# Patient Record
Sex: FEMALE | Race: WHITE | NOT HISPANIC OR LATINO | Employment: OTHER | URBAN - METROPOLITAN AREA
[De-identification: names, ages, dates, MRNs, and addresses within clinical notes are randomized per-mention and may not be internally consistent; named-entity substitution may affect disease eponyms.]

---

## 2017-01-24 ENCOUNTER — TRANSCRIBE ORDERS (OUTPATIENT)
Dept: ADMINISTRATIVE | Facility: HOSPITAL | Age: 57
End: 2017-01-24

## 2017-01-24 DIAGNOSIS — R10.9 ABDOMINAL PAIN, UNSPECIFIED SITE: Primary | ICD-10-CM

## 2017-02-12 ENCOUNTER — GENERIC CONVERSION - ENCOUNTER (OUTPATIENT)
Dept: OTHER | Facility: OTHER | Age: 57
End: 2017-02-12

## 2017-03-01 ENCOUNTER — ALLSCRIPTS OFFICE VISIT (OUTPATIENT)
Dept: OTHER | Facility: OTHER | Age: 57
End: 2017-03-01

## 2018-01-13 VITALS
RESPIRATION RATE: 16 BRPM | HEART RATE: 80 BPM | SYSTOLIC BLOOD PRESSURE: 112 MMHG | HEIGHT: 64 IN | DIASTOLIC BLOOD PRESSURE: 78 MMHG | BODY MASS INDEX: 25.95 KG/M2 | WEIGHT: 152 LBS | OXYGEN SATURATION: 97 % | TEMPERATURE: 97.7 F

## 2018-01-15 NOTE — RESULT NOTES
Message   Please inform the patient that her recent laboratory studies are normal including her blood count and kidney function  Please make sure that she has completed the CAT scan which was ordered  Please have her call with any questions or concerns or change in symptoms  Verified Results  (1) BASIC METABOLIC PROFILE [id number] 01:18PM Diego Lopez     Test Name Result Flag Reference   Glucose, Serum 99 mg/dL  65-99   BUN 14 mg/dL  6-24   Creatinine, Serum 0 68 mg/dL  0 57-1 00   eGFR If NonAfricn Am 97 mL/min/1 73  >59   eGFR If Africn Am 112 mL/min/1 73  >59   BUN/Creatinine Ratio 21  9-23   Sodium, Serum 141 mmol/L  134-144   Potassium, Serum 4 1 mmol/L  3 5-5 2   Chloride, Serum 101 mmol/L     Carbon Dioxide, Total 24 mmol/L  18-29   Calcium, Serum 9 4 mg/dL  8 7-10 2     (1) CBC/ PLT (NO DIFF) [id number] 01:18PM Dani Meredith     Test Name Result Flag Reference   WBC 6 3 x10E3/uL  3 4-10 8   RBC 4 85 x10E6/uL  3 77-5 28   Hemoglobin 14 3 g/dL  11 1-15 9   Hematocrit 41 6 %  34 0-46  6   MCV 86 fL  79-97   MCH 29 5 pg  26 6-33 0   MCHC 34 4 g/dL  31 5-35 7   RDW 13 9 %  12 3-15 4   Platelets 567 E51E4/Latvian  150-379

## 2019-09-24 ENCOUNTER — HOSPITAL ENCOUNTER (OUTPATIENT)
Facility: HOSPITAL | Age: 59
Setting detail: OBSERVATION
Discharge: HOME | End: 2019-09-26
Attending: EMERGENCY MEDICINE | Admitting: STUDENT IN AN ORGANIZED HEALTH CARE EDUCATION/TRAINING PROGRAM
Payer: COMMERCIAL

## 2019-09-24 ENCOUNTER — APPOINTMENT (EMERGENCY)
Dept: RADIOLOGY | Facility: HOSPITAL | Age: 59
End: 2019-09-24
Payer: COMMERCIAL

## 2019-09-24 DIAGNOSIS — K57.92 DIVERTICULITIS: Primary | ICD-10-CM

## 2019-09-24 DIAGNOSIS — R82.71 BACTERIURIA: ICD-10-CM

## 2019-09-24 LAB
ALBUMIN SERPL BCP-MCNC: 3.9 G/DL (ref 3.5–5)
ALP SERPL-CCNC: 92 U/L (ref 46–116)
ALT SERPL W P-5'-P-CCNC: 27 U/L (ref 12–78)
ANION GAP SERPL CALCULATED.3IONS-SCNC: 9 MMOL/L (ref 4–13)
APTT PPP: 28 SECONDS (ref 23–37)
AST SERPL W P-5'-P-CCNC: 14 U/L (ref 5–45)
BACTERIA UR QL AUTO: ABNORMAL /HPF
BASOPHILS # BLD AUTO: 0.02 THOUSANDS/ΜL (ref 0–0.1)
BASOPHILS NFR BLD AUTO: 0 % (ref 0–1)
BILIRUB SERPL-MCNC: 0.9 MG/DL (ref 0.2–1)
BILIRUB UR QL STRIP: NEGATIVE
BUN SERPL-MCNC: 9 MG/DL (ref 5–25)
CALCIUM SERPL-MCNC: 8.6 MG/DL (ref 8.3–10.1)
CHLORIDE SERPL-SCNC: 102 MMOL/L (ref 100–108)
CLARITY UR: ABNORMAL
CO2 SERPL-SCNC: 28 MMOL/L (ref 21–32)
COLOR UR: ABNORMAL
CREAT SERPL-MCNC: 0.74 MG/DL (ref 0.6–1.3)
EOSINOPHIL # BLD AUTO: 0.09 THOUSAND/ΜL (ref 0–0.61)
EOSINOPHIL NFR BLD AUTO: 1 % (ref 0–6)
ERYTHROCYTE [DISTWIDTH] IN BLOOD BY AUTOMATED COUNT: 12.7 % (ref 11.6–15.1)
GFR SERPL CREATININE-BSD FRML MDRD: 88 ML/MIN/1.73SQ M
GLUCOSE SERPL-MCNC: 111 MG/DL (ref 65–140)
GLUCOSE UR STRIP-MCNC: NEGATIVE MG/DL
HCT VFR BLD AUTO: 40.2 % (ref 34.8–46.1)
HGB BLD-MCNC: 13 G/DL (ref 11.5–15.4)
HGB UR QL STRIP.AUTO: ABNORMAL
IMM GRANULOCYTES # BLD AUTO: 0.03 THOUSAND/UL (ref 0–0.2)
IMM GRANULOCYTES NFR BLD AUTO: 0 % (ref 0–2)
INR PPP: 0.92 (ref 0.91–1.09)
KETONES UR STRIP-MCNC: NEGATIVE MG/DL
LACTATE SERPL-SCNC: 0.6 MMOL/L (ref 0.5–2)
LEUKOCYTE ESTERASE UR QL STRIP: ABNORMAL
LYMPHOCYTES # BLD AUTO: 1.86 THOUSANDS/ΜL (ref 0.6–4.47)
LYMPHOCYTES NFR BLD AUTO: 21 % (ref 14–44)
MCH RBC QN AUTO: 29.1 PG (ref 26.8–34.3)
MCHC RBC AUTO-ENTMCNC: 32.3 G/DL (ref 31.4–37.4)
MCV RBC AUTO: 90 FL (ref 82–98)
MONOCYTES # BLD AUTO: 0.89 THOUSAND/ΜL (ref 0.17–1.22)
MONOCYTES NFR BLD AUTO: 10 % (ref 4–12)
NEUTROPHILS # BLD AUTO: 6 THOUSANDS/ΜL (ref 1.85–7.62)
NEUTS SEG NFR BLD AUTO: 68 % (ref 43–75)
NITRITE UR QL STRIP: NEGATIVE
NON-SQ EPI CELLS URNS QL MICRO: ABNORMAL /HPF
NRBC BLD AUTO-RTO: 0 /100 WBCS
OTHER STN SPEC: ABNORMAL
PH UR STRIP.AUTO: 7 [PH]
PLATELET # BLD AUTO: 153 THOUSANDS/UL (ref 149–390)
PMV BLD AUTO: 12.7 FL (ref 8.9–12.7)
POTASSIUM SERPL-SCNC: 3.6 MMOL/L (ref 3.5–5.3)
PROT SERPL-MCNC: 7.1 G/DL (ref 6.4–8.2)
PROT UR STRIP-MCNC: NEGATIVE MG/DL
PROTHROMBIN TIME: 10 SECONDS (ref 9.8–12)
RBC # BLD AUTO: 4.47 MILLION/UL (ref 3.81–5.12)
RBC #/AREA URNS AUTO: ABNORMAL /HPF
SODIUM SERPL-SCNC: 139 MMOL/L (ref 136–145)
SP GR UR STRIP.AUTO: 1.01 (ref 1–1.03)
UROBILINOGEN UR QL STRIP.AUTO: 0.2 E.U./DL
WBC # BLD AUTO: 8.89 THOUSAND/UL (ref 4.31–10.16)
WBC #/AREA URNS AUTO: ABNORMAL /HPF
WBC CLUMPS # UR AUTO: PRESENT /UL

## 2019-09-24 PROCEDURE — 99285 EMERGENCY DEPT VISIT HI MDM: CPT

## 2019-09-24 PROCEDURE — 74176 CT ABD & PELVIS W/O CONTRAST: CPT

## 2019-09-24 PROCEDURE — 36415 COLL VENOUS BLD VENIPUNCTURE: CPT | Performed by: EMERGENCY MEDICINE

## 2019-09-24 PROCEDURE — 85730 THROMBOPLASTIN TIME PARTIAL: CPT | Performed by: EMERGENCY MEDICINE

## 2019-09-24 PROCEDURE — 85025 COMPLETE CBC W/AUTO DIFF WBC: CPT | Performed by: EMERGENCY MEDICINE

## 2019-09-24 PROCEDURE — 87040 BLOOD CULTURE FOR BACTERIA: CPT | Performed by: EMERGENCY MEDICINE

## 2019-09-24 PROCEDURE — 83605 ASSAY OF LACTIC ACID: CPT | Performed by: EMERGENCY MEDICINE

## 2019-09-24 PROCEDURE — 81001 URINALYSIS AUTO W/SCOPE: CPT | Performed by: EMERGENCY MEDICINE

## 2019-09-24 PROCEDURE — 80053 COMPREHEN METABOLIC PANEL: CPT | Performed by: EMERGENCY MEDICINE

## 2019-09-24 PROCEDURE — 85610 PROTHROMBIN TIME: CPT | Performed by: EMERGENCY MEDICINE

## 2019-09-24 PROCEDURE — 96365 THER/PROPH/DIAG IV INF INIT: CPT

## 2019-09-24 RX ORDER — LEVOFLOXACIN 5 MG/ML
750 INJECTION, SOLUTION INTRAVENOUS ONCE
Status: COMPLETED | OUTPATIENT
Start: 2019-09-24 | End: 2019-09-25

## 2019-09-24 RX ADMIN — LEVOFLOXACIN 750 MG: 5 INJECTION, SOLUTION INTRAVENOUS at 23:33

## 2019-09-24 NOTE — LETTER
[address] [facility] [address]  Dept: [phone]    September 26, 2019     Patient: Clemente Knight   YOB: 1960   Date of Visit: 9/24/2019       To Whom it May Concern:    Clemente Knight is under my professional care  She was seen in the hospital from 9/24/2019   to 09/26/19  She may return to work on 9/30/19    If you have any questions or concerns, please don't hesitate to call           Sincerely,          Mamta Hirsch, DO

## 2019-09-25 PROBLEM — K57.92 ACUTE DIVERTICULITIS: Status: ACTIVE | Noted: 2019-09-25

## 2019-09-25 PROBLEM — J45.909 ASTHMA: Status: ACTIVE | Noted: 2019-09-25

## 2019-09-25 PROBLEM — G47.33 OSA (OBSTRUCTIVE SLEEP APNEA): Status: ACTIVE | Noted: 2019-09-25

## 2019-09-25 LAB
ANION GAP SERPL CALCULATED.3IONS-SCNC: 9 MMOL/L (ref 4–13)
BASOPHILS # BLD AUTO: 0.02 THOUSANDS/ΜL (ref 0–0.1)
BASOPHILS NFR BLD AUTO: 0 % (ref 0–1)
BUN SERPL-MCNC: 8 MG/DL (ref 5–25)
CALCIUM SERPL-MCNC: 8.3 MG/DL (ref 8.3–10.1)
CHLORIDE SERPL-SCNC: 105 MMOL/L (ref 100–108)
CO2 SERPL-SCNC: 26 MMOL/L (ref 21–32)
CREAT SERPL-MCNC: 0.7 MG/DL (ref 0.6–1.3)
EOSINOPHIL # BLD AUTO: 0.07 THOUSAND/ΜL (ref 0–0.61)
EOSINOPHIL NFR BLD AUTO: 1 % (ref 0–6)
ERYTHROCYTE [DISTWIDTH] IN BLOOD BY AUTOMATED COUNT: 12.6 % (ref 11.6–15.1)
GFR SERPL CREATININE-BSD FRML MDRD: 94 ML/MIN/1.73SQ M
GLUCOSE P FAST SERPL-MCNC: 108 MG/DL (ref 65–99)
GLUCOSE SERPL-MCNC: 108 MG/DL (ref 65–140)
HCT VFR BLD AUTO: 36.8 % (ref 34.8–46.1)
HGB BLD-MCNC: 11.9 G/DL (ref 11.5–15.4)
IMM GRANULOCYTES # BLD AUTO: 0.03 THOUSAND/UL (ref 0–0.2)
IMM GRANULOCYTES NFR BLD AUTO: 0 % (ref 0–2)
LYMPHOCYTES # BLD AUTO: 2.02 THOUSANDS/ΜL (ref 0.6–4.47)
LYMPHOCYTES NFR BLD AUTO: 24 % (ref 14–44)
MCH RBC QN AUTO: 29.4 PG (ref 26.8–34.3)
MCHC RBC AUTO-ENTMCNC: 32.3 G/DL (ref 31.4–37.4)
MCV RBC AUTO: 91 FL (ref 82–98)
MONOCYTES # BLD AUTO: 0.8 THOUSAND/ΜL (ref 0.17–1.22)
MONOCYTES NFR BLD AUTO: 10 % (ref 4–12)
NEUTROPHILS # BLD AUTO: 5.35 THOUSANDS/ΜL (ref 1.85–7.62)
NEUTS SEG NFR BLD AUTO: 65 % (ref 43–75)
NRBC BLD AUTO-RTO: 0 /100 WBCS
PLATELET # BLD AUTO: 142 THOUSANDS/UL (ref 149–390)
PMV BLD AUTO: 12.3 FL (ref 8.9–12.7)
POTASSIUM SERPL-SCNC: 3.5 MMOL/L (ref 3.5–5.3)
RBC # BLD AUTO: 4.05 MILLION/UL (ref 3.81–5.12)
SODIUM SERPL-SCNC: 140 MMOL/L (ref 136–145)
WBC # BLD AUTO: 8.29 THOUSAND/UL (ref 4.31–10.16)

## 2019-09-25 PROCEDURE — 94660 CPAP INITIATION&MGMT: CPT

## 2019-09-25 PROCEDURE — 99219 PR INITIAL OBSERVATION CARE/DAY 50 MINUTES: CPT | Performed by: STUDENT IN AN ORGANIZED HEALTH CARE EDUCATION/TRAINING PROGRAM

## 2019-09-25 PROCEDURE — 87081 CULTURE SCREEN ONLY: CPT | Performed by: STUDENT IN AN ORGANIZED HEALTH CARE EDUCATION/TRAINING PROGRAM

## 2019-09-25 PROCEDURE — 85025 COMPLETE CBC W/AUTO DIFF WBC: CPT | Performed by: STUDENT IN AN ORGANIZED HEALTH CARE EDUCATION/TRAINING PROGRAM

## 2019-09-25 PROCEDURE — 99214 OFFICE O/P EST MOD 30 MIN: CPT | Performed by: INTERNAL MEDICINE

## 2019-09-25 PROCEDURE — 94760 N-INVAS EAR/PLS OXIMETRY 1: CPT

## 2019-09-25 PROCEDURE — 80048 BASIC METABOLIC PNL TOTAL CA: CPT | Performed by: STUDENT IN AN ORGANIZED HEALTH CARE EDUCATION/TRAINING PROGRAM

## 2019-09-25 PROCEDURE — 93005 ELECTROCARDIOGRAM TRACING: CPT

## 2019-09-25 RX ORDER — SODIUM CHLORIDE AND POTASSIUM CHLORIDE .9; .15 G/100ML; G/100ML
75 SOLUTION INTRAVENOUS CONTINUOUS
Status: DISCONTINUED | OUTPATIENT
Start: 2019-09-25 | End: 2019-09-26 | Stop reason: HOSPADM

## 2019-09-25 RX ORDER — ALBUTEROL SULFATE 2.5 MG/3ML
2.5 SOLUTION RESPIRATORY (INHALATION) EVERY 6 HOURS PRN
Status: DISCONTINUED | OUTPATIENT
Start: 2019-09-25 | End: 2019-09-26 | Stop reason: HOSPADM

## 2019-09-25 RX ORDER — LEVOFLOXACIN 5 MG/ML
750 INJECTION, SOLUTION INTRAVENOUS EVERY 24 HOURS
Status: DISCONTINUED | OUTPATIENT
Start: 2019-09-25 | End: 2019-09-25

## 2019-09-25 RX ORDER — LEVOTHYROXINE SODIUM 112 UG/1
112 TABLET ORAL
Status: DISCONTINUED | OUTPATIENT
Start: 2019-09-25 | End: 2019-09-26 | Stop reason: HOSPADM

## 2019-09-25 RX ORDER — CEFTRIAXONE 1 G/50ML
1000 INJECTION, SOLUTION INTRAVENOUS EVERY 24 HOURS
Status: DISCONTINUED | OUTPATIENT
Start: 2019-09-25 | End: 2019-09-26 | Stop reason: HOSPADM

## 2019-09-25 RX ORDER — ONDANSETRON 2 MG/ML
4 INJECTION INTRAMUSCULAR; INTRAVENOUS ONCE
Status: COMPLETED | OUTPATIENT
Start: 2019-09-25 | End: 2019-09-25

## 2019-09-25 RX ORDER — ACETAMINOPHEN 325 MG/1
650 TABLET ORAL EVERY 6 HOURS PRN
Status: DISCONTINUED | OUTPATIENT
Start: 2019-09-25 | End: 2019-09-26 | Stop reason: HOSPADM

## 2019-09-25 RX ORDER — ONDANSETRON 2 MG/ML
INJECTION INTRAMUSCULAR; INTRAVENOUS
Status: COMPLETED
Start: 2019-09-25 | End: 2019-09-25

## 2019-09-25 RX ORDER — SODIUM CHLORIDE 9 MG/ML
75 INJECTION, SOLUTION INTRAVENOUS CONTINUOUS
Status: DISCONTINUED | OUTPATIENT
Start: 2019-09-25 | End: 2019-09-25

## 2019-09-25 RX ORDER — ONDANSETRON 2 MG/ML
4 INJECTION INTRAMUSCULAR; INTRAVENOUS EVERY 6 HOURS PRN
Status: DISCONTINUED | OUTPATIENT
Start: 2019-09-25 | End: 2019-09-26 | Stop reason: HOSPADM

## 2019-09-25 RX ORDER — FLUTICASONE PROPIONATE 50 MCG
1 SPRAY, SUSPENSION (ML) NASAL DAILY
Status: DISCONTINUED | OUTPATIENT
Start: 2019-09-25 | End: 2019-09-26 | Stop reason: HOSPADM

## 2019-09-25 RX ORDER — LEVOTHYROXINE SODIUM 112 UG/1
125 TABLET ORAL DAILY
COMMUNITY
End: 2019-09-26 | Stop reason: HOSPADM

## 2019-09-25 RX ORDER — SODIUM CHLORIDE 9 MG/ML
50 INJECTION, SOLUTION INTRAVENOUS CONTINUOUS
Status: DISCONTINUED | OUTPATIENT
Start: 2019-09-25 | End: 2019-09-25

## 2019-09-25 RX ADMIN — METRONIDAZOLE 500 MG: 500 INJECTION, SOLUTION INTRAVENOUS at 17:36

## 2019-09-25 RX ADMIN — ONDANSETRON 4 MG: 2 INJECTION INTRAMUSCULAR; INTRAVENOUS at 01:49

## 2019-09-25 RX ADMIN — SODIUM CHLORIDE AND POTASSIUM CHLORIDE 75 ML/HR: .9; .15 SOLUTION INTRAVENOUS at 14:00

## 2019-09-25 RX ADMIN — METRONIDAZOLE 500 MG: 500 INJECTION, SOLUTION INTRAVENOUS at 01:43

## 2019-09-25 RX ADMIN — SODIUM CHLORIDE 50 ML/HR: 0.9 INJECTION, SOLUTION INTRAVENOUS at 02:55

## 2019-09-25 RX ADMIN — ENOXAPARIN SODIUM 40 MG: 40 INJECTION SUBCUTANEOUS at 09:49

## 2019-09-25 RX ADMIN — METRONIDAZOLE 500 MG: 500 INJECTION, SOLUTION INTRAVENOUS at 09:50

## 2019-09-25 RX ADMIN — LEVOTHYROXINE SODIUM 112 MCG: 112 TABLET ORAL at 05:24

## 2019-09-25 RX ADMIN — CEFTRIAXONE 1000 MG: 1 INJECTION, SOLUTION INTRAVENOUS at 23:29

## 2019-09-25 RX ADMIN — FLUTICASONE PROPIONATE 1 SPRAY: 50 SPRAY, METERED NASAL at 09:54

## 2019-09-25 NOTE — CONSULTS
Consultation - [address] [facility]  Alina Abreu 62 y o  female MRN: [id number]  Unit/Bed#: [address] [facility] Encounter: [id number]        Inpatient consult to gastroenterology  Consult performed by: Francisco Pineda PA-C  Consult ordered by: Bernice Malik MD        Reason for Consult / Principal Problem: Acute diverticulitis    HPI: Alina Abreu is a 62y o  year old female with past history of diverticulitis for which she was treated in 2016 who presented to the emergency room yesterday with complaint of lower abdominal pain which had been going on for approximately 2 weeks  She said it started out dull and intermittent, she later started with subjective fevers and chills, and said that the night before she came to the emergency room the pain had become much more intense and localized to left lower quadrant  She denies any blood in her stools or melena, or any recent diarrhea  CT scan of the abdomen and pelvis in the emergency room showed findings consistent with sigmoid diverticulitis, no discrete abscess was seen  White blood cell count appears normal with no bandemia, electrolytes and liver function tests appear within normal limits  She did appear febrile on presentation with temperature of 101 6° and heart rate of 100  She has been placed on clear liquid diet and started on IV Levaquin and Flagyl  At this time, the patient tells me she is feeling better than she was yesterday  Patient tells me she had 2 episodes of diverticulitis in the past, once in 2016 and once about a year before that  She follows with GI Dr Javon Rm in (patient thinks) [place], she says she gets colonoscopies every 2 years because both her sister and her mother passed away from colon cancer  Patient says that her last colonoscopy was done within the last year, she says this exam was normal but she has had polyps on previous exams    Patient denies any recent disturbances to her bowel habits, any blood or mucus in the stools, any issues with diarrhea or constipation  She traveled to [place] in [place], she denies any unusual food intake, she denies any recent sick contacts or recent use of antibiotics  She thinks her current pain is similar to that which she experienced with previous episodes of diverticulitis  REVIEW OF SYSTEMS:    CONSTITUTIONAL: Denies any fever, chills, or rigors  Good appetite, and no recent weight loss  HEENT: No earache or tinnitus  Denies hearing loss or visual disturbances  CARDIOVASCULAR: No chest pain or palpitations  RESPIRATORY: Denies any cough, hemoptysis, shortness of breath or dyspnea on exertion  GASTROINTESTINAL: As noted in the History of Present Illness  GENITOURINARY: No problems with urination  Denies any hematuria or dysuria  NEUROLOGIC: No dizziness or vertigo, denies headaches  MUSCULOSKELETAL: Denies any muscle or joint pain  SKIN: Denies skin rashes or itching  ENDOCRINE: Denies excessive thirst  Denies intolerance to heat or cold  PSYCHOSOCIAL: Denies depression or anxiety  Denies any recent memory loss  Historical Information   Past Medical History:   Diagnosis Date    Asthma     Disease of thyroid gland     Diverticulitis     Sleep apnea      Past Surgical History:   Procedure Laterality Date     SECTION      x 3    CHOLECYSTECTOMY      KNEE SURGERY Left      Social History   Social History     Substance and Sexual Activity   Alcohol Use Yes    Alcohol/week: 2 0 standard drinks    Types: 2 Cans of beer per week    Frequency: Monthly or less    Drinks per session: 1 or 2    Binge frequency: Never     Social History     Substance and Sexual Activity   Drug Use Never     Social History     Tobacco Use   Smoking Status Former Smoker    Types: Cigarettes    Last attempt to quit:     Years since quittin 7   Smokeless Tobacco Never Used     History reviewed  No pertinent family history      Meds/Allergies Medications Prior to Admission   Medication    fluticasone-salmeterol (ADVAIR DISKUS, WIXELA INHUB) 250-50 mcg/dose inhaler    levothyroxine 112 mcg tablet    other medication, see sig,    albuterol (2 5 mg/3 mL) 0 083 % nebulizer solution    fluticasone (FLONASE) 50 mcg/act nasal spray    levothyroxine 125 mcg tablet     Current Facility-Administered Medications   Medication Dose Route Frequency    acetaminophen (TYLENOL) tablet 650 mg  650 mg Oral Q6H PRN    albuterol inhalation solution 2 5 mg  2 5 mg Nebulization Q6H PRN    enoxaparin (LOVENOX) subcutaneous injection 40 mg  40 mg Subcutaneous Daily    fluticasone (FLONASE) 50 mcg/act nasal spray 1 spray  1 spray Nasal Daily    levofloxacin (LEVAQUIN) IVPB (premix) 750 mg  750 mg Intravenous Q24H    levothyroxine tablet 112 mcg  112 mcg Oral Early Morning    metroNIDAZOLE (FLAGYL) IVPB (premix) 500 mg  500 mg Intravenous Q8H    morphine injection 2 mg  2 mg Intravenous Q4H PRN    ondansetron (ZOFRAN) injection 4 mg  4 mg Intravenous Q6H PRN    sodium chloride 0 9 % infusion  50 mL/hr Intravenous Continuous       Allergies   Allergen Reactions    Shellfish-Derived Products      IV contrast dye           Objective     Blood pressure 113/60, pulse 80, temperature 100 4 °F (38 °C), temperature source Tympanic, resp  rate 20, height 5' 1" (1 549 m), weight 79 2 kg (174 lb 9 7 oz), SpO2 96 %, not currently breastfeeding        Intake/Output Summary (Last 24 hours) at 9/25/2019 0841  Last data filed at 9/25/2019 6597  Gross per 24 hour   Intake 999 ml   Output 1550 ml   Net -551 ml         PHYSICAL EXAM     General Appearance:   Alert, cooperative, no distress, appears stated age    HEENT:   Normocephalic, atraumatic, anicteric      Neck:  Supple, symmetrical, trachea midline, no adenopathy;    thyroid: no enlargement/tenderness/nodules; no carotid  bruit or JVD    Lungs:   Clear to auscultation bilaterally; no rales, rhonchi or wheezing; respirations unlabored    Heart[de-identified]   S1 and S2 normal; regular rate and rhythm; no murmur, rub, or gallop     Abdomen:   Soft, mild left lower quadrant tenderness with no guarding, non-distended; normal bowel sounds; no masses, no organomegaly    Genitalia:   Deferred    Rectal:   Deferred    Extremities:  No cyanosis, clubbing or edema    Pulses:  2+ and symmetric all extremities    Skin:  Skin color, texture, turgor normal, no rashes or lesions    Lymph nodes:  No palpable cervical, axillary or inguinal lymphadenopathy        Lab Results:   Admission on 09/24/2019   Component Date Value    PTT 09/24/2019 28     Protime 09/24/2019 10 0     INR 09/24/2019 0 92     WBC 09/24/2019 8 89     RBC 09/24/2019 4 47     Hemoglobin 09/24/2019 13 0     Hematocrit 09/24/2019 40 2     MCV 09/24/2019 90     MCH 09/24/2019 29 1     MCHC 09/24/2019 32 3     RDW 09/24/2019 12 7     MPV 09/24/2019 12 7     Platelets 14/63/9872 153     nRBC 09/24/2019 0     Neutrophils Relative 09/24/2019 68     Immat GRANS % 09/24/2019 0     Lymphocytes Relative 09/24/2019 21     Monocytes Relative 09/24/2019 10     Eosinophils Relative 09/24/2019 1     Basophils Relative 09/24/2019 0     Neutrophils Absolute 09/24/2019 6 00     Immature Grans Absolute 09/24/2019 0 03     Lymphocytes Absolute 09/24/2019 1 86     Monocytes Absolute 09/24/2019 0 89     Eosinophils Absolute 09/24/2019 0 09     Basophils Absolute 09/24/2019 0 02     Sodium 09/24/2019 139     Potassium 09/24/2019 3 6     Chloride 09/24/2019 102     CO2 09/24/2019 28     ANION GAP 09/24/2019 9     BUN 09/24/2019 9     Creatinine 09/24/2019 0 74     Glucose 09/24/2019 111     Calcium 09/24/2019 8 6     AST 09/24/2019 14     ALT 09/24/2019 27     Alkaline Phosphatase 09/24/2019 92     Total Protein 09/24/2019 7 1     Albumin 09/24/2019 3 9     Total Bilirubin 09/24/2019 0 90     eGFR 09/24/2019 88     LACTIC ACID 09/24/2019 0 6     Color, UA 09/24/2019 Light Yellow     Clarity, UA 09/24/2019 Slightly Cloudy     Specific Gravity, UA 09/24/2019 1 010     pH, UA 09/24/2019 7 0     Leukocytes, UA 09/24/2019 Small*    Nitrite, UA 09/24/2019 Negative     Protein, UA 09/24/2019 Negative     Glucose, UA 09/24/2019 Negative     Ketones, UA 09/24/2019 Negative     Urobilinogen, UA 09/24/2019 0 2     Bilirubin, UA 09/24/2019 Negative     Blood, UA 09/24/2019 Trace-lysed*    RBC, UA 09/24/2019 2-4*    WBC, UA 09/24/2019 4-10*    Epithelial Cells 09/24/2019 Moderate*    Bacteria, UA 09/24/2019 Occasional     WBC Clumps 09/24/2019 present     OTHER OBSERVATIONS 09/24/2019 Renal Epithelial Cells Present     Sodium 09/25/2019 140     Potassium 09/25/2019 3 5     Chloride 09/25/2019 105     CO2 09/25/2019 26     ANION GAP 09/25/2019 9     BUN 09/25/2019 8     Creatinine 09/25/2019 0 70     Glucose 09/25/2019 108     Glucose, Fasting 09/25/2019 108*    Calcium 09/25/2019 8 3     eGFR 09/25/2019 94     WBC 09/25/2019 8 29     RBC 09/25/2019 4 05     Hemoglobin 09/25/2019 11 9     Hematocrit 09/25/2019 36 8     MCV 09/25/2019 91     MCH 09/25/2019 29 4     MCHC 09/25/2019 32 3     RDW 09/25/2019 12 6     MPV 09/25/2019 12 3     Platelets 43/93/6146 142*    nRBC 09/25/2019 0     Neutrophils Relative 09/25/2019 65     Immat GRANS % 09/25/2019 0     Lymphocytes Relative 09/25/2019 24     Monocytes Relative 09/25/2019 10     Eosinophils Relative 09/25/2019 1     Basophils Relative 09/25/2019 0     Neutrophils Absolute 09/25/2019 5 35     Immature Grans Absolute 09/25/2019 0 03     Lymphocytes Absolute 09/25/2019 2 02     Monocytes Absolute 09/25/2019 0 80     Eosinophils Absolute 09/25/2019 0 07     Basophils Absolute 09/25/2019 0 02      Results for Yanelis Stafford (MRN [id number]) as of 9/25/2019 08:42   Ref   Range 2/1/2017 13:18 9/24/2019 22:03 9/24/2019 22:03 9/24/2019 22:14 9/25/2019 05:17   Sodium Latest Ref Range: 136 - 145 mmol/L 141 139   140   Potassium Latest Ref Range: 3 5 - 5 3 mmol/L 4 1 3 6   3 5   Chloride Latest Ref Range: 100 - 108 mmol/L 101 102   105   CO2 Latest Ref Range: 21 - 32 mmol/L 24 28   26   Anion Gap Latest Ref Range: 4 - 13 mmol/L  9   9   BUN Latest Ref Range: 5 - 25 mg/dL 14 9   8   Creatinine Latest Ref Range: 0 60 - 1 30 mg/dL 0 68 0 74   0 70   Glucose, Random Latest Ref Range: 65 - 140 mg/dL 99 111   108   GLUCOSE FASTING Latest Ref Range: 65 - 99 mg/dL     108 (H)   Calcium Latest Ref Range: 8 3 - 10 1 mg/dL 9 4 8 6   8 3   AST Latest Ref Range: 5 - 45 U/L  14      ALT Latest Ref Range: 12 - 78 U/L  27      Alkaline Phosphatase Latest Ref Range: 46 - 116 U/L  92      Total Protein Latest Ref Range: 6 4 - 8 2 g/dL  7 1      Albumin Latest Ref Range: 3 5 - 5 0 g/dL  3 9      TOTAL BILIRUBIN Latest Ref Range: 0 20 - 1 00 mg/dL  0 90      eGFR Latest Units: ml/min/1 73sq m  88   94   RBC Latest Ref Range: 3 77 - 5 28 x10E6/uL 4 85       LACTIC ACID Latest Ref Range: 0 5 - 2 0 mmol/L  0 6      WBC Latest Ref Range: 4 31 - 10 16 Thousand/uL 6 3 8 89   8 29   Red Blood Cell Count Latest Ref Range: 3 81 - 5 12 Million/uL  4 47   4 05   Hemoglobin Latest Ref Range: 11 5 - 15 4 g/dL 14 3 13 0   11 9   HCT Latest Ref Range: 34 8 - 46 1 % 41 6 40 2   36 8   MCV Latest Ref Range: 82 - 98 fL 86 90   91   MCH Latest Ref Range: 26 8 - 34 3 pg 29 5 29 1   29 4   MCHC Latest Ref Range: 31 4 - 37 4 g/dL 34 4 32 3   32 3   RDW Latest Ref Range: 11 6 - 15 1 % 13 9 12 7   12 6   Platelet Count Latest Ref Range: 149 - 390 Thousands/uL 209 153   142 (L)   MPV Latest Ref Range: 8 9 - 12 7 fL  12 7   12 3   nRBC Latest Units: /100 WBCs  0   0   Neutrophils % Latest Ref Range: 43 - 75 %  68   65   Immat GRANS % Latest Ref Range: 0 - 2 %  0   0   Lymphocytes Relative Latest Ref Range: 14 - 44 %  21   24   Monocytes Relative Latest Ref Range: 4 - 12 %  10   10   Eosinophils Latest Ref Range: 0 - 6 %  1   1   Basophils Relative Latest Ref Range: 0 - 1 %  0   0   Immature Grans Absolute Latest Ref Range: 0 00 - 0 20 Thousand/uL  0 03   0 03   Absolute Neutrophils Latest Ref Range: 1 85 - 7 62 Thousands/µL  6 00   5 35   Lymphocytes Absolute Latest Ref Range: 0 60 - 4 47 Thousands/µL  1 86   2 02   Absolute Monocytes Latest Ref Range: 0 17 - 1 22 Thousand/µL  0 89   0 80   Absolute Eosinophils Latest Ref Range: 0 00 - 0 61 Thousand/µL  0 09   0 07   Basophils Absolute Latest Ref Range: 0 00 - 0 10 Thousands/µL  0 02   0 02   WBC CLUMPS Unknown    present    Protime Latest Ref Range: 9 8 - 12 0 seconds  10 0      INR Latest Ref Range: 0 91 - 1 09   0 92      PTT Latest Ref Range: 23 - 37 seconds  28      Epithelial Cells Latest Ref Range: None Seen, Occasional /hpf    Moderate (A)    Color, UA Unknown    Light Yellow    Clarity, UA Unknown    Slightly Cloudy    SL AMB SPECIFIC GRAVITY_URINE Latest Ref Range: 1 000 - 1 030     1 010    Glucose, UA Latest Ref Range: Negative mg/dl    Negative    Ketones, UA Latest Ref Range: Negative mg/dl    Negative    Blood, UA Latest Ref Range: Negative     Trace-lysed (A)    Nitrite, UA Latest Ref Range: Negative     Negative    Leukocytes, UA Latest Ref Range: Negative     Small (A)    pH, UA Latest Ref Range: 5 0, 5 5, 6 0, 6 5, 7 0, 7 5, 8 0, 8 5, 9 0     7 0    POCT URINE PROTEIN Latest Ref Range: Negative mg/dl    Negative    Bilirubin, UA Latest Ref Range: Negative     Negative    SL AMB POCT UROBILINOGEN Latest Ref Range: 0 2, 1 0 E U /dl E U /dl    0 2    RBC, UA Latest Ref Range: None Seen, 0-5 /hpf    2-4 (A)    WBC, UA Latest Ref Range: None Seen, 0-5, 5-55, 5-65 /hpf    4-10 (A)    Bacteria, UA Latest Ref Range: None Seen, Occasional /hpf    Occasional    OTHER OBSERVATIONS Unknown    Renal Epithelial Cells Present    BLOOD CULTURE Unknown  Rpt ((NONE)) Rpt ((NONE))     MRSA CULTURE Unknown     Rpt ((NONE))   AAGFR Latest Ref Range: >59 mL/min/1 73 112       BUN/CREA Ratio Latest Ref Range: 9 - 23  21       EGFR-AMERICAN CALC Latest Ref Range: >59 mL/min/1 73 97           Imaging Studies: I have personally reviewed pertinent reports  CT ABDOMEN AND PELVIS WITHOUT IV CONTRAST     INDICATION:   abd pain/ fever      COMPARISON:  CT abdomen and pelvis dated 11/4/2016     TECHNIQUE:  CT examination of the abdomen and pelvis was performed without intravenous contrast   Axial, sagittal, and coronal 2D reformatted images were created from the source data and submitted for interpretation       Radiation dose length product (DLP) for this visit:  491 96 mGy-cm   This examination, like all CT scans performed in the [facility], was performed utilizing techniques to minimize radiation dose exposure, including the use of iterative   reconstruction and automated exposure control       Enteric contrast was not administered       FINDINGS:     ABDOMEN     LOWER CHEST:  Subcentimeter calcified granuloma is incidentally seen in the right lower lobe  No clinically significant abnormality identified in the visualized lower chest      LIVER/BILIARY TREE:  3 3 cm cyst in the posterior right hepatic lobe is incidentally suspected  The liver otherwise appears grossly unremarkable      GALLBLADDER:  No calcified gallstones  No pericholecystic inflammatory change      SPLEEN:  Unremarkable      PANCREAS:  Unremarkable      ADRENAL GLANDS:  Unremarkable      KIDNEYS/URETERS:  Several tiny nonobstructing stones are seen in the left kidney  Partially duplicated left renal collecting system  Left kidney otherwise appears grossly unremarkable; no hydronephrosis      STOMACH AND BOWEL:  Colonic diverticulosis is noted, most prominent in the left colon  There is some focal mild to moderate wall thickening at the junction of the descending colon and sigmoid colon with some associated pericolonic fat stranding and a   small amount of fluid which tracks into the left paracolic gutter    Findings taken together are highly suspicious for focal acute diverticulitis  No discrete pericolonic abscess is seen      APPENDIX:  No findings to suggest appendicitis      ABDOMINOPELVIC CAVITY:  No intraperitoneal free air  No bulky adenopathy      VESSELS:  Unremarkable for patient's age  No aortic aneurysm      PELVIS     REPRODUCTIVE ORGANS:  Suspected exophytic posterior uterine fibroid measures approximately 5 3 cm in size      URINARY BLADDER:  Grossly unremarkable      ABDOMINAL WALL/INGUINAL REGIONS:  Unremarkable      OSSEOUS STRUCTURES:  No acute fracture or destructive osseous lesion      IMPRESSION:     Colonic diverticulosis with findings highly suspicious for focal acute diverticulitis in the left lower quadrant at the junction of the descending and sigmoid colon as described (axial image 54, series 2, for example)  No discrete pericolonic abscess   is seen      Left-sided nephrolithiasis without discrete hydronephrosis      Suspected uterine fibroid, right hepatic cyst and other findings as above  ASSESSMENT/PLAN:     1  Acute uncomplicated sigmoid diverticulitis, appears to be patient's 3rd occurrence in a period of approximately 4-5 years    She appears clinically improving with respect to this episode, tolerating clear liquid diet, reporting less pain than yesterday    - continue IV antibiotics  - continue bowel rest, gradually advance diet as tolerated, can try full liquid diet for lunch  - monitor abdominal exam, temperature, white blood cell count; if any concerning findings, consider repeating abdominal imaging to exclude complications such as microperforation, abscess  - although patient reports having had colonoscopy within the last year and normally gets them done every 2 years, I did advise her to check with her gastroenterologist Dr Bob Orlando to see if they feel that earlier follow-up colonoscopy would be warranted, given her recurrent diverticulitis and her significant family history of colon cancer  - I also advised patient that she does have another recurrence of diverticulitis in the near future, it would be advisable to consult with colorectal surgery in case surgical management would be indicated      The patient was seen and examined by Dr Sathish Ceja, all [facility] medical decisions were made with Dr Sathish Ceja  Thank you for allowing us to participate in the care of this pleasant patient  We will follow up with you closely

## 2019-09-25 NOTE — H&P
History and Physical - [facility] Internal Medicine    Patient Information: Sally Cerna 62 y o  female MRN: [id number]  Unit/Bed#: ED 08 Encounter: [id number]  Admitting Physician: Rhianna Gonzalez MD  PCP: Rae Keller MD  Date of Admission:  09/25/19    Chief Complaint:     Abdominal pain   of Present Illness:    Sally Cerna is a 62 y o  female with a PMH of Hypothyroidism, Asthma and ROBBY who presents with abdominal pain  She states that approximately 2 weeks ago she began to have lower abdominal pain  It was dull in nature and intermittent  She also noted subjective fevers and chills  Last night she states that the pain became much more intense and localized to her LLQ  She rated the pain as 8/10 on the pain scale  It was associated with nausea but no vomiting  Due to the severity of the pain she came to the ED where CT imaging was concerning for acute diverticulitis  Currently she reports intermittent lower abdominal pain which is worse in her LLQ  She rates it 6/10 on the pain scale  She denies any recent diarrhea, blood in her stools, melena or dysuria  No other complaints or concerns  Review of Systems:    Review of Systems   Constitutional: Positive for fever  HENT: Positive for congestion  Respiratory: Negative for cough and shortness of breath  Cardiovascular: Negative for chest pain and leg swelling  Gastrointestinal: Positive for abdominal pain and nausea  Negative for blood in stool, diarrhea and vomiting  Genitourinary: Negative for dysuria and hematuria  Skin: Negative for rash  Neurological: Negative for syncope  Psychiatric/Behavioral: Negative for confusion  Past Medical and Surgical History:     Past Medical History:   Diagnosis Date    Asthma     Disease of thyroid gland     Diverticulitis        History reviewed  No pertinent surgical history      Meds/Allergies:    PTA meds:   Prior to Admission Medications   Prescriptions Last Dose Informant Patient Reported? Taking? albuterol (2 5 mg/3 mL) 0 083 % nebulizer solution   Yes No   Sig: Take 2 5 mg by nebulization every 6 (six) hours as needed for wheezing   fluticasone (FLONASE) 50 mcg/act nasal spray   Yes No   Si spray into each nostril daily   levothyroxine 125 mcg tablet   Yes No   Sig: Take 125 mcg by mouth daily      Facility-Administered Medications: None       Allergies: Allergies   Allergen Reactions    Shellfish-Derived Products      IV contrast dye     History:     Marital Status: /Civil Union   Social History     Substance and Sexual Activity   Alcohol Use Yes     Social History     Tobacco Use   Smoking Status Former Smoker    Types: Cigarettes    Last attempt to quit: Barbie Mei Years since quittin 7   Smokeless Tobacco Never Used     Social History     Substance and Sexual Activity   Drug Use Not on file       Family History:    History reviewed  No pertinent family history  Physical Exam:     Vitals:   Blood Pressure: 118/72 (19)  Pulse: 92 (19)  Temperature: (!) 101 6 °F (38 7 °C) (19)  Temp Source: Tympanic (19)  Respirations: 22 (19)  Weight - Scale: 72 6 kg (160 lb) (19)  SpO2: 94 % (19)    Physical Exam:   General: in no acute distress however uncomfortable due to pain  HEENT: atraumatic, normocephalic  Skin: no jaundice, not diaphoretic  CVS: tachycardic, no murmurs appreciated  Lungs: CTAL, no wheezing or rales appreciated  Abdomen: soft, nondistended, bowel sounds normal, tenderness upon palpation of LLQ, no guarding or rebound tenderness  Extremities: no edema, no calf swelling or tenderness  Neuro: alert and oriented x3  Psych: anxious, cooperative      Lab Results: I have personally reviewed pertinent reports        Results from last 7 days   Lab Units 19  2203   WBC Thousand/uL 8 89   HEMOGLOBIN g/dL 13 0   HEMATOCRIT % 40 2   PLATELETS Thousands/uL 153   NEUTROS PCT % 68   LYMPHS PCT % 21   MONOS PCT % 10   EOS PCT % 1     Results from last 7 days   Lab Units 09/24/19  2203   POTASSIUM mmol/L 3 6   CHLORIDE mmol/L 102   CO2 mmol/L 28   BUN mg/dL 9   CREATININE mg/dL 0 74   CALCIUM mg/dL 8 6   ALK PHOS U/L 92   ALT U/L 27   AST U/L 14     Results from last 7 days   Lab Units 09/24/19  2203   INR  0 92       Imaging: I have personally reviewed pertinent reports  Ct Abdomen Pelvis Wo Contrast    Result Date: 9/25/2019  Narrative: CT ABDOMEN AND PELVIS WITHOUT IV CONTRAST INDICATION:   abd pain/ fever  COMPARISON:  CT abdomen and pelvis dated 11/4/2016 TECHNIQUE:  CT examination of the abdomen and pelvis was performed without intravenous contrast   Axial, sagittal, and coronal 2D reformatted images were created from the source data and submitted for interpretation  Radiation dose length product (DLP) for this visit:  491 96 mGy-cm   This examination, like all CT scans performed in the [facility], was performed utilizing techniques to minimize radiation dose exposure, including the use of iterative  reconstruction and automated exposure control  Enteric contrast was not administered  FINDINGS: ABDOMEN LOWER CHEST:  Subcentimeter calcified granuloma is incidentally seen in the right lower lobe  No clinically significant abnormality identified in the visualized lower chest  LIVER/BILIARY TREE:  3 3 cm cyst in the posterior right hepatic lobe is incidentally suspected  The liver otherwise appears grossly unremarkable  GALLBLADDER:  No calcified gallstones  No pericholecystic inflammatory change  SPLEEN:  Unremarkable  PANCREAS:  Unremarkable  ADRENAL GLANDS:  Unremarkable  KIDNEYS/URETERS:  Several tiny nonobstructing stones are seen in the left kidney  Partially duplicated left renal collecting system  Left kidney otherwise appears grossly unremarkable; no hydronephrosis  STOMACH AND BOWEL:  Colonic diverticulosis is noted, most prominent in the left colon    There is some focal mild to moderate wall thickening at the junction of the descending colon and sigmoid colon with some associated pericolonic fat stranding and a small amount of fluid which tracks into the left paracolic gutter  Findings taken together are highly suspicious for focal acute diverticulitis  No discrete pericolonic abscess is seen  APPENDIX:  No findings to suggest appendicitis  ABDOMINOPELVIC CAVITY:  No intraperitoneal free air  No bulky adenopathy  VESSELS:  Unremarkable for patient's age  No aortic aneurysm  PELVIS REPRODUCTIVE ORGANS:  Suspected exophytic posterior uterine fibroid measures approximately 5 3 cm in size  URINARY BLADDER:  Grossly unremarkable  ABDOMINAL WALL/INGUINAL REGIONS:  Unremarkable  OSSEOUS STRUCTURES:  No acute fracture or destructive osseous lesion  Impression: Colonic diverticulosis with findings highly suspicious for focal acute diverticulitis in the left lower quadrant at the junction of the descending and sigmoid colon as described (axial image 54, series 2, for example)  No discrete pericolonic abscess is seen  Left-sided nephrolithiasis without discrete hydronephrosis  Suspected uterine fibroid, right hepatic cyst and other findings as above  Workstation performed: [id number]         Assessment/Plan    * Acute diverticulitis  Assessment & Plan  CT reports the following:   Colonic diverticulosis with findings highly suspicious for focal acute diverticulitis in the left lower quadrant at the junction of the descending and sigmoid colon as described (axial image 47, series 2, for example)  No discrete pericolonic abscess   is seen      Pt is still reporting moderate to severe LLQ pain therefore will admit to Medicine for pain control, resume Levaquin and Flagyl, place on clear liquid diet with plan to advance as tolerated and consult GI service     ROBBY (obstructive sleep apnea)  Assessment & Plan  Resume CPAP    Asthma  Assessment & Plan  Continue Albuterol prn      Hypothyroid  Assessment & Plan  Continue [facility] Problem List:     Principal Problem:    Acute diverticulitis  Active Problems:    Hypothyroid    Asthma    ROBBY (obstructive sleep apnea)        VTE Prophylaxis: Lovenox   Code Status: Prior    Anticipated Length of Stay:  Patient will be admitted on an Observation basis with an anticipated length of stay of atleast 1 midnights  Total Time for Visit, including Counseling / Coordination of Care: 30 minutes  Greater than 50% of this total time spent on direct patient counseling and coordination of care

## 2019-09-25 NOTE — PLAN OF CARE
Problem: RESPIRATORY - ADULT  Goal: Achieves optimal ventilation and oxygenation  Description  INTERVENTIONS:  - Assess for changes in respiratory status  - Assess for changes in mentation and behavior  - Position to facilitate oxygenation and minimize respiratory effort  - Oxygen administered by appropriate delivery if ordered  - Initiate smoking cessation education as indicated  - Encourage broncho-pulmonary hygiene including cough, deep breathe, Incentive Spirometry  - Assess the need for suctioning and aspirate as needed  - Assess and instruct to report SOB or any respiratory difficulty  - Respiratory Therapy support as indicated  Outcome: Progressing     Problem: PAIN - ADULT  Goal: Verbalizes/displays adequate comfort level or baseline comfort level  Description  Interventions:  - Encourage patient to monitor pain and request assistance  - Assess pain using appropriate pain scale  - Administer analgesics based on type and severity of pain and evaluate response  - Implement non-pharmacological measures as appropriate and evaluate response  - Consider cultural and social influences on pain and pain management  - Notify physician/advanced practitioner if interventions unsuccessful or patient reports new pain  Outcome: Progressing     Problem: INFECTION - ADULT  Goal: Absence or prevention of progression during hospitalization  Description  INTERVENTIONS:  - Assess and monitor for signs and symptoms of infection  - Monitor lab/diagnostic results  - Monitor all insertion sites, i e  indwelling lines, tubes, and drains  - Monitor endotracheal if appropriate and nasal secretions for changes in amount and color  - [place] appropriate cooling/warming therapies per order  - Administer medications as ordered  - Instruct and encourage patient and family to use good hand hygiene technique  - Identify and instruct in appropriate isolation precautions for identified infection/condition  Outcome: Progressing  Goal: Absence of fever/infection during neutropenic period  Description  INTERVENTIONS:  - Monitor WBC    Outcome: Progressing     Problem: Nutrition/Hydration-ADULT  Goal: Nutrient/Hydration intake appropriate for improving, restoring or maintaining nutritional needs  Description  Monitor and assess patient's nutrition/hydration status for malnutrition  Collaborate with interdisciplinary team and initiate plan and interventions as ordered  Monitor patient's weight and dietary intake as ordered or per policy  Utilize nutrition screening tool and intervene as necessary  Determine patient's food preferences and provide high-protein, high-caloric foods as appropriate       INTERVENTIONS:  - Monitor oral intake, urinary output, labs, and treatment plans  - Assess nutrition and hydration status and recommend course of action  - Evaluate amount of meals eaten  - Assist patient with eating if necessary   - Allow adequate time for meals  - Recommend/ encourage appropriate diets, oral nutritional supplements, and vitamin/mineral supplements  - Order, calculate, and assess calorie counts as needed  - Assess need for intravenous fluids  - Provide specific nutrition/hydration education as appropriate  - Include patient/family/caregiver in decisions related to nutrition   Outcome: Progressing

## 2019-09-25 NOTE — PLAN OF CARE
Problem: RESPIRATORY - ADULT  Goal: Achieves optimal ventilation and oxygenation  Description  INTERVENTIONS:  - Assess for changes in respiratory status  - Assess for changes in mentation and behavior  - Position to facilitate oxygenation and minimize respiratory effort  - Oxygen administered by appropriate delivery if ordered  - Initiate smoking cessation education as indicated  - Encourage broncho-pulmonary hygiene including cough, deep breathe, Incentive Spirometry  - Assess the need for suctioning and aspirate as needed  - Assess and instruct to report SOB or any respiratory difficulty  - Respiratory Therapy support as indicated  Outcome: Progressing     Problem: PAIN - ADULT  Goal: Verbalizes/displays adequate comfort level or baseline comfort level  Description  Interventions:  - Encourage patient to monitor pain and request assistance  - Assess pain using appropriate pain scale  - Administer analgesics based on type and severity of pain and evaluate response  - Implement non-pharmacological measures as appropriate and evaluate response  - Consider cultural and social influences on pain and pain management  - Notify physician/advanced practitioner if interventions unsuccessful or patient reports new pain  Outcome: Progressing     Problem: INFECTION - ADULT  Goal: Absence or prevention of progression during hospitalization  Description  INTERVENTIONS:  - Assess and monitor for signs and symptoms of infection  - Monitor lab/diagnostic results  - Monitor all insertion sites, i e  indwelling lines, tubes, and drains  - Monitor endotracheal if appropriate and nasal secretions for changes in amount and color  - [place] appropriate cooling/warming therapies per order  - Administer medications as ordered  - Instruct and encourage patient and family to use good hand hygiene technique  - Identify and instruct in appropriate isolation precautions for identified infection/condition  Outcome: Progressing  Goal: Absence of fever/infection during neutropenic period  Description  INTERVENTIONS:  - Monitor WBC    Outcome: Progressing

## 2019-09-25 NOTE — UTILIZATION REVIEW
Initial Clinical Review    Admission: Date/Time/Statement:    Admission Orders (From admission, onward)     Ordered        09/25/19 0056  Place in Observation  Once                   Orders Placed This Encounter   Procedures    Place in Observation     Standing Status:   Standing     Number of Occurrences:   1     Order Specific Question:   Admitting Physician     Answer:   Maddi Burleson [[id number]]     Order Specific Question:   Level of Care     Answer:   Med Surg [[id number]]     ED Arrival Information     Expected Arrival Acuity Means of Arrival Escorted By Service Admission Type    - 9/24/2019 21:30 Urgent Walk-In Self Hospitalist Urgent    Arrival Complaint    Fever and lower abd pain        Chief Complaint   Patient presents with    Abdominal Pain     pt presents to the ed with bilateral lower abdominal pain on and off for several days      Assessment/Plan:   67-year-old female presents stating that she has had lower abdominal discomfort on and off the last several days and has had low-grade fevers  Patient states today her pain got much worse in her lower abdomen and she has a 101 6 fever after having had 3 motions at home prior to arrival   Patient does have history of diverticulitis as well as kidney stones  Denies stating that does not feel like her diverticulitis  Has had some slight difficulty with urination but no vomiting diarrhea or bowel symptoms    Acute diverticulitis  Assessment & Plan  CT reports the following:   Colonic diverticulosis with findings highly suspicious for focal acute diverticulitis in the left lower quadrant at the junction of the descending and sigmoid colon as described (axial image 47, series 2, for example)    No discrete pericolonic abscess   is seen      Pt is still reporting moderate to severe LLQ pain therefore will admit to Medicine for pain control, resume Levaquin and Flagyl, place on clear liquid diet with plan to advance as tolerated and consult GI service     ED Triage Vitals   Temperature Pulse Respirations Blood Pressure SpO2   09/24/19 2137 09/24/19 2137 09/24/19 2137 09/24/19 2137 09/24/19 2137   (!) 101 6 °F (38 7 °C) (!) 108 18 140/79 94 %      Temp Source Heart Rate Source Patient Position - Orthostatic VS BP Location FiO2 (%)   09/24/19 2137 09/24/19 2137 09/24/19 2342 09/24/19 2342 --   Tympanic Monitor Lying Right arm       Pain Score       09/25/19 0221       7        Wt Readings from Last 1 Encounters:   09/25/19 79 2 kg (174 lb 9 7 oz)     Additional Vital Signs:   09/25/19 0831  --  --  --  --  96 %  None (Room air)  --   09/25/19 0350  --  --  --  --  94 %  None (Room air)  --   09/25/19 0220  100 4 °F (38 °C)  80  20  113/60  94 %  None (Room air)  Lying     Pertinent Labs/Diagnostic Test Results:   Results from last 7 days   Lab Units 09/25/19  0517 09/24/19  2203   WBC Thousand/uL 8 29 8 89   HEMOGLOBIN g/dL 11 9 13 0   HEMATOCRIT % 36 8 40 2   PLATELETS Thousands/uL 142* 153   NEUTROS ABS Thousands/µL 5 35 6 00     Results from last 7 days   Lab Units 09/25/19  0517 09/24/19  2203   SODIUM mmol/L 140 139   POTASSIUM mmol/L 3 5 3 6   CHLORIDE mmol/L 105 102   CO2 mmol/L 26 28   ANION GAP mmol/L 9 9   BUN mg/dL 8 9   CREATININE mg/dL 0 70 0 74   EGFR ml/min/1 73sq m 94 88   CALCIUM mg/dL 8 3 8 6     Results from last 7 days   Lab Units 09/24/19  2203   AST U/L 14   ALT U/L 27   ALK PHOS U/L 92   TOTAL PROTEIN g/dL 7 1   ALBUMIN g/dL 3 9   TOTAL BILIRUBIN mg/dL 0 90     Results from last 7 days   Lab Units 09/25/19  0517 09/24/19  2203   GLUCOSE RANDOM mg/dL 108 111     Results from last 7 days   Lab Units 09/24/19  2203   PROTIME seconds 10 0   INR  0 92   PTT seconds 28     Results from last 7 days   Lab Units 09/24/19  2203   LACTIC ACID mmol/L 0 6     Results from last 7 days   Lab Units 09/24/19  2214   CLARITY UA  Slightly Cloudy   COLOR UA  Light Yellow   SPEC GRAV UA  1 010   PH UA  7 0   GLUCOSE UA mg/dl Negative   KETONES UA mg/dl Negative   BLOOD UA Trace-lysed*   PROTEIN UA mg/dl Negative   NITRITE UA  Negative   BILIRUBIN UA  Negative   UROBILINOGEN UA E U /dl 0 2   LEUKOCYTES UA  Small*   WBC UA /hpf 4-10*   RBC UA /hpf 2-4*   BACTERIA UA /hpf Occasional   EPITHELIAL CELLS WET PREP /hpf Moderate*     CT A/P=Colonic diverticulosis with findings highly suspicious for focal acute diverticulitis in the left lower quadrant at the junction of the descending and sigmoid colon as described (axial image 54, series 2, for example)  No discrete pericolonic abscess is seen  Left-sided nephrolithiasis without discrete hydronephrosis  Suspected uterine fibroid, right hepatic cyst and other findings as above    ED Treatment:   Medication Administration from 09/24/2019 2130 to 09/25/2019 0211       Date/Time Order Dose Route Action Action by Comments     09/24/2019 2333 levofloxacin (LEVAQUIN) IVPB (premix) 750 mg 750 mg Intravenous New [facility]      09/25/2019 0143 metroNIDAZOLE (FLAGYL) IVPB (premix) 500 mg 500 mg Intravenous New [facility]      09/25/2019 0149 ondansetron (ZOFRAN) injection 4 mg 4 mg Intravenous Given Narinder Russell RN         Past Medical History:   Diagnosis Date    Asthma     Disease of thyroid gland     Diverticulitis     Sleep apnea      Present on Admission:   Hypothyroid  Admitting Diagnosis: Diverticulitis [K57 92]  Abdominal pain [R10 9]  Age/Sex: 62 y o  female  Admission Orders:  MED SURG  CONSULT GI  CLEAR LQIUDS  Current Facility-Administered Medications:  acetaminophen 650 mg Oral Q6H PRN   albuterol 2 5 mg Nebulization Q6H PRN   enoxaparin 40 mg Subcutaneous Daily   fluticasone 1 spray Nasal Daily   levofloxacin 750 mg Intravenous Q24H   levothyroxine 112 mcg Oral Early Morning   metroNIDAZOLE 500 mg Intravenous Q8H   morphine injection 2 mg Intravenous Q4H PRN   ondansetron 4 mg Intravenous Q6H PRN   sodium chloride 50 mL/hr Intravenous Continuous     Network Utilization Review Department  Phone: [phone]; Fax [phone]  [email or web address]  ATTENTION: Please call with any questions or concerns to [phone]  and carefully listen to the prompts so that you are directed to the right person  Send all requests for admission clinical reviews, approved or denied determinations and any other requests to fax [phone]   All voicemails are confidential

## 2019-09-25 NOTE — ASSESSMENT & PLAN NOTE
CT reports the following:   Colonic diverticulosis with findings highly suspicious for focal acute diverticulitis in the left lower quadrant at the junction of the descending and sigmoid colon as described (axial image 54, series 2, for example)  No discrete pericolonic abscess   is seen  Received IV Levaquin x 1 day, IV Rocephin x 1 day, IV Flagyl x 2 days  Patient tolerating low residue diet  Pain improving  Patient seen by GI  Cleared for discharge today  Recommend continue antibiotics for 10 days total   Will discharge patient home on Flagyl 500mg p o  Every 8 hours and Cefdinir 300mg po every 12 hours x 8 more days  Tylenol p r n  At home for pain  Continue low residue diet for 6 weeks  Follow-up primary GI as outpatient  Recommend taking Metamucil over-the-counter daily after acute infection is resolved  Follow-up PCP in 1 week  Advised patient to return to ED for recurrent fever, chills, worsening abdominal pain

## 2019-09-25 NOTE — PLAN OF CARE
Problem: RESPIRATORY - ADULT  Goal: Achieves optimal ventilation and oxygenation  Description  INTERVENTIONS:  - Assess for changes in respiratory status  - Position to facilitate oxygenation and minimize respiratory effort  - Oxygen administered by appropriate delivery if ordered  - Initiate smoking cessation education as indicated  - Encourage broncho-pulmonary hygiene including cough, deep breathe, Incentive Spirometry  - Assess the need for suctioning and aspirate as needed  - Assess and instruct to report SOB or any respiratory difficulty  - Respiratory Therapy support as indicated  - CPAP as per orders  Outcome: Progressing

## 2019-09-25 NOTE — QUICK NOTE
Patient seen and examined  Reports left-sided and lower abdominal pain  Reports nausea,no vomiting  Denies chest pain, headaches, dizziness, SOB  Lungs clear to auscultation  S1-S2  Abdomen soft,tenderness in LLQ  No edema to extremities  Labs reviewed  Changed IV Levaquin to IV Rocephin, continue Flagyl  Consult nutrition for diverticulosis diet teaching  Increased IV fluids to 75cc/hr  GI following, appreciate input

## 2019-09-25 NOTE — ED PROVIDER NOTES
History  Chief Complaint   Patient presents with    Abdominal Pain     pt presents to the ed with bilateral lower abdominal pain on and off for several days      47-year-old female presents stating that she has had lower abdominal discomfort on and off the last several days and has had low-grade fevers  Patient states today her pain got much worse in her lower abdomen and she has a 101 6 fever after having had 3 motions at home prior to arrival   Patient does have history of diverticulitis as well as kidney stones  Denies stating that does not feel like her diverticulitis  Has had some slight difficulty with urination but no vomiting diarrhea or bowel symptoms  History provided by:  Patient   used: No        Prior to Admission Medications   Prescriptions Last Dose Informant Patient Reported? Taking? albuterol (2 5 mg/3 mL) 0 083 % nebulizer solution   Yes No   Sig: Take 2 5 mg by nebulization every 6 (six) hours as needed for wheezing   fluticasone (FLONASE) 50 mcg/act nasal spray Not Taking at Unknown time  Yes No   Si spray into each nostril daily   fluticasone-salmeterol (ADVAIR DISKUS, WIXELA INHUB) 250-50 mcg/dose inhaler  Self Yes Yes   Sig: Inhale 1 puff 2 (two) times a day Rinse mouth after use     levothyroxine 112 mcg tablet  Self Yes Yes   Sig: Take 125 mcg by mouth daily Taken q Monday to Friday   levothyroxine 125 mcg tablet  Self Yes No   Sig: Take 125 mcg by mouth daily Taken q Saturday and    other medication, see sig,  Self Yes Yes   Sig: Take 1 tablet by mouth daily Medication/product name: Boy Finch  Strength:   Sig (include dose, route, frequency):1 tablet daily      Facility-Administered Medications: None       Past Medical History:   Diagnosis Date    Asthma     Disease of thyroid gland     Diverticulitis     Sleep apnea        Past Surgical History:   Procedure Laterality Date     SECTION      x 3    CHOLECYSTECTOMY      KNEE SURGERY Left History reviewed  No pertinent family history  I have reviewed and agree with the history as documented  Social History     Tobacco Use    Smoking status: Former Smoker     Types: Cigarettes     Last attempt to quit: 1985     Years since quittin 7    Smokeless tobacco: Never Used   Substance Use Topics    Alcohol use: Yes     Alcohol/week: 2 0 standard drinks     Types: 2 Cans of beer per week     Frequency: Monthly or less     Drinks per session: 1 or 2     Binge frequency: Never    Drug use: Never        Review of Systems   Constitutional: Negative for activity change, chills, diaphoresis and fever  HENT: Negative for congestion, ear pain, nosebleeds, sore throat, trouble swallowing and voice change  Eyes: Negative for pain, discharge and redness  Respiratory: Negative for apnea, cough, choking, shortness of breath, wheezing and stridor  Cardiovascular: Negative for chest pain and palpitations  Gastrointestinal: Positive for abdominal pain  Negative for abdominal distention, constipation, diarrhea, nausea and vomiting  Endocrine: Negative for polydipsia  Genitourinary: Negative for difficulty urinating, dysuria, flank pain, frequency, hematuria and urgency  Musculoskeletal: Negative for back pain, gait problem, joint swelling, myalgias, neck pain and neck stiffness  Skin: Negative for pallor and rash  Neurological: Negative for dizziness, tremors, syncope, speech difficulty, weakness, numbness and headaches  Hematological: Negative for adenopathy  Psychiatric/Behavioral: Negative for confusion, hallucinations, self-injury and suicidal ideas  The patient is not nervous/anxious  Physical Exam  Physical Exam   Constitutional: She is oriented to person, place, and time  She appears well-developed and well-nourished  No distress  HENT:   Head: Normocephalic and atraumatic     Right Ear: External ear normal    Left Ear: External ear normal    Nose: Nose normal  Mouth/Throat: Oropharynx is clear and moist    Eyes: Pupils are equal, round, and reactive to light  Conjunctivae are normal    Neck: Normal range of motion  Neck supple  Cardiovascular: Normal rate, regular rhythm, normal heart sounds and intact distal pulses  Pulmonary/Chest: Effort normal and breath sounds normal    Abdominal: Soft  Bowel sounds are normal  There is tenderness in the suprapubic area  Musculoskeletal: Normal range of motion  Neurological: She is alert and oriented to person, place, and time  She has normal reflexes  Skin: Skin is warm and dry  She is not diaphoretic  Psychiatric: She has a normal mood and affect  Nursing note and vitals reviewed        Vital Signs  ED Triage Vitals   Temperature Pulse Respirations Blood Pressure SpO2   09/24/19 2137 09/24/19 2137 09/24/19 2137 09/24/19 2137 09/24/19 2137   (!) 101 6 °F (38 7 °C) (!) 108 18 140/79 94 %      Temp Source Heart Rate Source Patient Position - Orthostatic VS BP Location FiO2 (%)   09/24/19 2137 09/24/19 2137 09/24/19 2342 09/24/19 2342 --   Tympanic Monitor Lying Right arm       Pain Score       09/25/19 0221       7           Vitals:    09/25/19 0900 09/25/19 1636 09/25/19 2016 09/25/19 2345   BP: 100/54 126/64 125/55    Pulse: 70 75 73 74   Patient Position - Orthostatic VS: Lying Sitting Lying Lying         Visual Acuity      ED Medications  Medications   metroNIDAZOLE (FLAGYL) IVPB (premix) 500 mg (500 mg Intravenous New Bag 9/25/19 1736)   albuterol inhalation solution 2 5 mg (has no administration in time range)   fluticasone (FLONASE) 50 mcg/act nasal spray 1 spray (1 spray Nasal Given 9/25/19 0954)   levothyroxine tablet 112 mcg (112 mcg Oral Given 9/25/19 0524)   ondansetron (ZOFRAN) injection 4 mg (has no administration in time range)   enoxaparin (LOVENOX) subcutaneous injection 40 mg (40 mg Subcutaneous Given 9/25/19 0949)   acetaminophen (TYLENOL) tablet 650 mg (has no administration in time range)   morphine injection 2 mg (has no administration in time range)   cefTRIAXone (ROCEPHIN) IVPB (premix) 1,000 mg (1,000 mg Intravenous New Bag 9/25/19 2329)   sodium chloride 0 9 % with KCl 20 mEq/L infusion (premix) (75 mL/hr Intravenous New Bag 9/25/19 1400)   levofloxacin (LEVAQUIN) IVPB (premix) 750 mg (750 mg Intravenous New Bag 9/24/19 2333)   metroNIDAZOLE (FLAGYL) IVPB (premix) 500 mg (500 mg Intravenous New Bag 9/25/19 0143)   ondansetron (ZOFRAN) injection 4 mg (4 mg Intravenous Given 9/25/19 0149)       Diagnostic Studies  Results Reviewed     Procedure Component Value Units Date/Time    Urine Microscopic [[id number]]  (Abnormal) Collected:  09/24/19 2214    Lab Status:  Final result Specimen:  Urine, Clean Catch Updated:  09/24/19 2239     RBC, UA 2-4 /hpf      WBC, UA 4-10 /hpf      Epithelial Cells Moderate /hpf      Bacteria, UA Occasional /hpf      WBC Clumps present     OTHER OBSERVATIONS Renal Epithelial Cells Present    Lactic acid x2 [[id number]]  (Normal) Collected:  09/24/19 2203    Lab Status:  Final result Specimen:  Blood from Arm, Left Updated:  09/24/19 2232     LACTIC ACID 0 6 mmol/L     Narrative:       Result may be elevated if tourniquet was used during collection      Comprehensive metabolic panel [[id number]] Collected:  09/24/19 2203    Lab Status:  Final result Specimen:  Blood from Arm, Left Updated:  09/24/19 2229     Sodium 139 mmol/L      Potassium 3 6 mmol/L      Chloride 102 mmol/L      CO2 28 mmol/L      ANION GAP 9 mmol/L      BUN 9 mg/dL      Creatinine 0 74 mg/dL      Glucose 111 mg/dL      Calcium 8 6 mg/dL      AST 14 U/L      ALT 27 U/L      Alkaline Phosphatase 92 U/L      Total Protein 7 1 g/dL      Albumin 3 9 g/dL      Total Bilirubin 0 90 mg/dL      eGFR 88 ml/min/1 73sq m     Narrative:       [place] guidelines for Chronic Kidney Disease (CKD):     Stage 1 with normal or high GFR (GFR > 90 mL/min/1 73 square meters)    Stage 2 Mild CKD (GFR = 60-89 mL/min/1 73 square meters)    Stage 3A Moderate CKD (GFR = 45-59 mL/min/1 73 square meters)    Stage 3B Moderate CKD (GFR = 30-44 mL/min/1 73 square meters)    Stage 4 Severe CKD (GFR = 15-29 mL/min/1 73 square meters)    Stage 5 End Stage CKD (GFR <15 mL/min/1 73 square meters)  Note: GFR calculation is accurate only with a steady state creatinine    APTT [[id number]]  (Normal) Collected:  09/24/19 2203    Lab Status:  Final result Specimen:  Blood from Arm, Left Updated:  09/24/19 2225     PTT 28 seconds     Protime-INR [[id number]]  (Normal) Collected:  09/24/19 2203    Lab Status:  Final result Specimen:  Blood from Arm, Left Updated:  09/24/19 2225     Protime 10 0 seconds      INR 0 92    UA w Reflex to Microscopic w Reflex to Culture [[id number]]  (Abnormal) Collected:  09/24/19 2214    Lab Status:  Final result Specimen:  Urine, Clean Catch Updated:  09/24/19 2218     Color, UA Light Yellow     Clarity, UA Slightly Cloudy     Specific [place], UA 1 010     pH, UA 7 0     Leukocytes, UA Small     Nitrite, UA Negative     Protein, UA Negative mg/dl      Glucose, UA Negative mg/dl      Ketones, UA Negative mg/dl      Urobilinogen, UA 0 2 E U /dl      Bilirubin, UA Negative     Blood, UA Trace-lysed    CBC and differential [[id number]] Collected:  09/24/19 2203    Lab Status:  Final result Specimen:  Blood from Arm, Left Updated:  09/24/19 2212     WBC 8 89 Thousand/uL      RBC 4 47 Million/uL      Hemoglobin 13 0 g/dL      Hematocrit 40 2 %      MCV 90 fL      MCH 29 1 pg      MCHC 32 3 g/dL      RDW 12 7 %      MPV 12 7 fL      Platelets 891 Thousands/uL      nRBC 0 /100 WBCs      Neutrophils Relative 68 %      Immat GRANS % 0 %      Lymphocytes Relative 21 %      Monocytes Relative 10 %      Eosinophils Relative 1 %      Basophils Relative 0 %      Neutrophils Absolute 6 00 Thousands/µL      Immature Grans Absolute 0 03 Thousand/uL      Lymphocytes Absolute 1 86 Thousands/µL      Monocytes Absolute 0 89 Thousand/µL      Eosinophils Absolute 0 09 Thousand/µL      Basophils Absolute 0 02 Thousands/µL     Blood culture #1 [[id number]] Collected:  09/24/19 2203    Lab Status: In process Specimen:  Blood from Arm, Right Updated:  09/24/19 2211    Blood culture #2 [[id number]] Collected:  09/24/19 2203    Lab Status: In process Specimen:  Blood from Arm, Left Updated:  09/24/19 2211                 CT abdomen pelvis wo contrast   Final Result by Jewel Norton DO (09/25 2277)      Colonic diverticulosis with findings highly suspicious for focal acute diverticulitis in the left lower quadrant at the junction of the descending and sigmoid colon as described (axial image 54, series 2, for example)  No discrete pericolonic abscess    is seen  Left-sided nephrolithiasis without discrete hydronephrosis  Suspected uterine fibroid, right hepatic cyst and other findings as above  Workstation performed: [id number]                    Procedures  Procedures       ED Course                               MDM    Disposition  Final diagnoses:   Diverticulitis     Time reflects when diagnosis was documented in both MDM as applicable and the Disposition within this note     Time User Action Codes Description Comment    9/25/2019 12:56 AM Oriana Snell Add [K57 [id number]] Diverticulitis       ED Disposition     ED Disposition Condition Date/Time Comment    Admit Stable Wed Sep 25, 2019 12:56 AM Case was discussed with Dr Betty Jennings and the patient's admission status was agreed to be Admission Status: observation status to the service of Dr Betty Jennings   Follow-up Information    None         Current Discharge Medication List      CONTINUE these medications which have NOT CHANGED    Details   fluticasone-salmeterol (ADVAIR DISKUS, WIXELA INHUB) 250-50 mcg/dose inhaler Inhale 1 puff 2 (two) times a day Rinse mouth after use      Comments: Substitution to a formulary equivalent within the same pharmaceutical class is authorized  !! levothyroxine 112 mcg tablet Take 125 mcg by mouth daily Taken q Monday to Friday      other medication, see sig, Take 1 tablet by mouth daily Medication/product name: Aruna Kim  Strength:   Sig (include dose, route, frequency):1 tablet daily      albuterol (2 5 mg/3 mL) 0 083 % nebulizer solution Take 2 5 mg by nebulization every 6 (six) hours as needed for wheezing      fluticasone (FLONASE) 50 mcg/act nasal spray 1 spray into each nostril daily      !! levothyroxine 125 mcg tablet Take 125 mcg by mouth daily Taken q Saturday and sunday       !! - Potential duplicate medications found  Please discuss with provider  No discharge procedures on file      ED Provider  Electronically Signed by           Gaetano Lewis DO  09/26/19 9031

## 2019-09-26 VITALS
BODY MASS INDEX: 30.39 KG/M2 | OXYGEN SATURATION: 96 % | SYSTOLIC BLOOD PRESSURE: 123 MMHG | DIASTOLIC BLOOD PRESSURE: 77 MMHG | HEART RATE: 75 BPM | HEIGHT: 61 IN | WEIGHT: 160.94 LBS | TEMPERATURE: 96.8 F | RESPIRATION RATE: 18 BRPM

## 2019-09-26 PROBLEM — A41.9 SEPSIS (HCC): Status: ACTIVE | Noted: 2019-09-26

## 2019-09-26 PROBLEM — R82.71 BACTERIURIA: Status: ACTIVE | Noted: 2019-09-26

## 2019-09-26 LAB — MRSA NOSE QL CULT: NORMAL

## 2019-09-26 PROCEDURE — 87086 URINE CULTURE/COLONY COUNT: CPT | Performed by: NURSE PRACTITIONER

## 2019-09-26 PROCEDURE — 99217 PR OBSERVATION CARE DISCHARGE MANAGEMENT: CPT | Performed by: FAMILY MEDICINE

## 2019-09-26 PROCEDURE — 99214 OFFICE O/P EST MOD 30 MIN: CPT | Performed by: INTERNAL MEDICINE

## 2019-09-26 RX ORDER — CEFDINIR 300 MG/1
300 CAPSULE ORAL EVERY 12 HOURS SCHEDULED
Qty: 16 CAPSULE | Refills: 0 | Status: SHIPPED | OUTPATIENT
Start: 2019-09-26 | End: 2019-10-04

## 2019-09-26 RX ORDER — METRONIDAZOLE 500 MG/1
500 TABLET ORAL EVERY 8 HOURS SCHEDULED
Qty: 25 TABLET | Refills: 0 | Status: SHIPPED | OUTPATIENT
Start: 2019-09-26 | End: 2019-10-05

## 2019-09-26 RX ADMIN — METRONIDAZOLE 500 MG: 500 INJECTION, SOLUTION INTRAVENOUS at 01:51

## 2019-09-26 RX ADMIN — LEVOTHYROXINE SODIUM 112 MCG: 112 TABLET ORAL at 06:25

## 2019-09-26 RX ADMIN — METRONIDAZOLE 500 MG: 500 INJECTION, SOLUTION INTRAVENOUS at 16:00

## 2019-09-26 RX ADMIN — ENOXAPARIN SODIUM 40 MG: 40 INJECTION SUBCUTANEOUS at 09:24

## 2019-09-26 RX ADMIN — FLUTICASONE PROPIONATE 1 SPRAY: 50 SPRAY, METERED NASAL at 09:24

## 2019-09-26 RX ADMIN — SODIUM CHLORIDE AND POTASSIUM CHLORIDE 75 ML/HR: .9; .15 SOLUTION INTRAVENOUS at 03:02

## 2019-09-26 RX ADMIN — METRONIDAZOLE 500 MG: 500 INJECTION, SOLUTION INTRAVENOUS at 09:23

## 2019-09-26 NOTE — DISCHARGE INSTRUCTIONS
Complete antibiotic x 8 more days  Start cefdinir tonight  Start Flagyl tomorrow morning  Please return to ED for recurrent fever, chills, worsening abdominal pain  Low residue diet x 6 weeks  Recommend over-the-counter Metamucil daily after acute infection is resolved  Follow-up PCP in 1 week  Follow-up primary GI as needed  We will notify you if urine culture came back positive and not covered by antibiotic you are taking  We will notify you if final blood culture came back postive

## 2019-09-26 NOTE — DISCHARGE SUMMARY
Discharge- Liza Calderon 1960, 62 y o  female MRN: [id number]    Unit/Bed#: [address] [place] [address] Encounter: [id number]    Primary Care Provider: Nydia Samuel MD   Date and time admitted to hospital: 9/24/2019  9:34 PM        * Acute diverticulitis  Assessment & Plan  CT reports the following:   Colonic diverticulosis with findings highly suspicious for focal acute diverticulitis in the left lower quadrant at the junction of the descending and sigmoid colon as described (axial image 47, series 2, for example)  No discrete pericolonic abscess   is seen  Received IV Levaquin x 1 day, IV Rocephin x 1 day, IV Flagyl x 2 days  Patient tolerating low residue diet  Pain improving  Patient seen by GI  Cleared for discharge today  Recommend continue antibiotics for 10 days total   Will discharge patient home on Flagyl 500mg p o  Every 8 hours and Cefdinir 300mg po every 12 hours x 8 more days  Tylenol p r n  At home for pain  Continue low residue diet for 6 weeks  Follow-up primary GI as outpatient  Recommend taking Metamucil over-the-counter daily after acute infection is resolved  Follow-up PCP in 1 week  Advised patient to return to ED for recurrent fever, chills, worsening abdominal pain  Sepsis ([address] )  Assessment & Plan  POA  As evidenced by fever 101 6, tachycardia, with source of infection acute diverticulitis  Doubt UTI  Lactic acid normal   T-max 100 1 past 24 hours  Blood cultures x2 no growth at 24 hours  Urine culture pending  Will follow  Discharge patient home with above antibiotics  Follow-up PCP in 1 week  Bacteriuria  Assessment & Plan  UA showed WBC 4-10, small leukocytes  Patient reports urine has odor to it but denies burning on urination, denies urgency or frequency  Urine culture pending  Will follow         ROBYB (obstructive sleep apnea)  Assessment & Plan  Resume CPAP    Asthma  Assessment & Plan  Continue Albuterol prn      Hypothyroid  Assessment & Plan  Continue Synthroid       Discharging Physician / Practitioner: Nelson Burroughs, [address]  PCP: Ira Eubanks MD  Admission Date: 9/24/2019  Discharge Date: 09/26/19    Reason for Admission: Abdominal Pain (pt presents to the ed with bilateral lower abdominal pain on and off for several days )        Resolved Problems  Date Reviewed: 9/26/2019    None          Consultations During Hospital Stay:  [id number] [facility]  IP CONSULT TO NUTRITION SERVICES    Procedures Performed:     · none    Significant Findings / Test Results:     · As below  Results from last 7 days   Lab Units 09/25/19  0517 09/24/19  2203   WBC Thousand/uL 8 29 8 89   HEMOGLOBIN g/dL 11 9 13 0   PLATELETS Thousands/uL 142* 153     Results from last 7 days   Lab Units 09/25/19  0517 09/24/19  2203   SODIUM mmol/L 140 139   POTASSIUM mmol/L 3 5 3 6   CHLORIDE mmol/L 105 102   CO2 mmol/L 26 28   BUN mg/dL 8 9   CREATININE mg/dL 0 70 0 74   CALCIUM mg/dL 8 3 8 6   TOTAL BILIRUBIN mg/dL  --  0 90   ALK PHOS U/L  --  92   ALT U/L  --  27   AST U/L  --  14     Results from last 7 days   Lab Units 09/24/19  2203   INR  0 92         No results found for: HGBA1C      Results from last 7 days   Lab Units 09/24/19  2203   LACTIC ACID mmol/L 0 6     Blood Culture:   Lab Results   Component Value Date    BLOODCX No Growth at 24 hrs  09/24/2019    BLOODCX No Growth at 24 hrs  09/24/2019    BLOODCX No Growth After 5 Days  11/04/2016    BLOODCX No Growth After 5 Days   11/04/2016     Urine Culture:   Lab Results   Component Value Date    URINECX 70,000-79,000 cfu/ml Lactobacillus species 11/04/2016     Sputum Culture: No components found for: SPUTUMCX  Wound Culture: No results found for: WOUNDCULT     CT abdomen pelvis wo contrast   Final Result by Milton Graham DO (09/25 2225)      Colonic diverticulosis with findings highly suspicious for focal acute diverticulitis in the left lower quadrant at the junction of the descending and sigmoid colon as described (axial image 54, series 2, for example)  No discrete pericolonic abscess    is seen  Left-sided nephrolithiasis without discrete hydronephrosis  Suspected uterine fibroid, right hepatic cyst and other findings as above  Workstation performed: [id number]                Incidental Findings:   · none     Test Results Pending at Discharge (will require follow up):   · Urine culture, final blood culture result  Outpatient Tests Requested:  · None    Complications:  None    Reason for Admission:   Chief Complaint   Patient presents with    Abdominal Pain     pt presents to the ed with bilateral lower abdominal pain on and off for several days        Hospital Course:     Larissa Ellis is a 62 y o  female patient with a PMH of diverticulosis/diverticulitis, hypothyroid, asthma, sleep apnea who originally presented to the hospital on 9/24/2019 due to acute uncomplicated diverticulitis and sepsis due to acute diverticulitis  Patient improved with IV Levaquin/Rocephin/IV Flagyl  Tolerated soft, low residue diet today  Abdominal pain improved with above treatment  Patient had low-grade fever past 24 hours  Patient is cleared for discharge by GI  Will prescribe cefdinir and p o  Flagyl for 8 more days to complete 10 day course  Patient was seen by dietitian for diverticulosis diet teaching  Continue soft, low residue diet for 6 weeks  Recommend over-the-counter Metamucil after acute infection is resolved  Follow-up PCP in 1 week  Follow-up primary GI as needed  Patient reports urine has odor to it but denies burning on urination, urgency, frequency  UA showed bacteriuria  Urine culture pending at time of discharge  Will follow up result  Will notify patient if antibiotic adjustment is needed  Will follow up on final blood culture results as well  Spoke to patient at bedside, all questions answered        Please see above list of diagnoses and related plan for additional information  Condition at Discharge: good       Discharge Day Visit / Exam:     Subjective:  Patient denies abdominal tenderness today  Denies nausea, vomiting  Tolerating low residual and soft diet  Patient reports odor to urine but denies urinary urgency, frequency, burning on urination  Patient reports SOB overnight and required albuterol inhaler  Patient denies chest pain, headaches, dizziness, SOB currently  Denies fever or chills  Vitals: Blood Pressure: 123/77 (09/26/19 1623)  Pulse: 75 (09/26/19 1623)  Temperature: (!) 96 8 °F (36 °C) (09/26/19 1623)  Temp Source: Tympanic (09/26/19 1623)  Respirations: 18 (09/26/19 1623)  Height: 5' 1" (154 9 cm) (09/25/19 0220)  Weight - Scale: 73 kg (160 lb 15 oz) (09/26/19 0549)  SpO2: 96 % (09/26/19 1623)  Exam:   Physical Exam   Constitutional: She is oriented to person, place, and time  She appears well-developed and well-nourished  HENT:   Head: Normocephalic and atraumatic  Neck: Normal range of motion  Neck supple  No JVD present  No tracheal deviation present  No thyromegaly present  Cardiovascular: Normal rate and regular rhythm  No murmur heard  Pulmonary/Chest: Effort normal and breath sounds normal  No respiratory distress  She has no wheezes  She has no rales  Abdominal: Soft  Bowel sounds are normal  She exhibits no distension  There is no tenderness  There is no guarding  Musculoskeletal: Normal range of motion  She exhibits no edema, tenderness or deformity  Neurological: She is alert and oriented to person, place, and time  Skin: Skin is warm and dry  Psychiatric: She has a normal mood and affect  Judgment normal    Nursing note and vitals reviewed  Discharge instructions/Information to patient and family:   See after visit summary for information provided to patient and family        Provisions for Follow-Up Care:  See after visit summary for information related to follow-up care and any pertinent home health orders  Disposition:     Home    Planned Readmission: no     Discharge Statement:  I spent 35 minutes discharging the patient  This time was spent on the day of discharge  I had direct contact with the patient on the day of discharge  Greater than 50% of the total time was spent examining patient, answering all patient questions, arranging and discussing plan of care with patient as well as directly providing post-discharge instructions  Additional time then spent on discharge activities  Discharge Medications:  See after visit summary for reconciled discharge medications provided to patient and family        ** Please Note: This note has been constructed using a voice recognition system **

## 2019-09-26 NOTE — ASSESSMENT & PLAN NOTE
UA showed WBC 4-10, small leukocytes  Patient reports urine has odor to it but denies burning on urination, denies urgency or frequency  Urine culture pending  Will follow

## 2019-09-26 NOTE — ASSESSMENT & PLAN NOTE
POA   As evidenced by fever 101 6, tachycardia, with source of infection acute diverticulitis  Doubt UTI  Lactic acid normal   T-max 100 1 past 24 hours  Blood cultures x2 no growth at 24 hours  Urine culture pending  Will follow  Discharge patient home with above antibiotics  Follow-up PCP in 1 week

## 2019-09-26 NOTE — PROGRESS NOTES
Progress Note - Jay Caro 62 y o  female MRN: [id number]    Unit/Bed#: [address] [facility] 501-77 Encounter: [id number]        Subjective:   Patient reports having some less pain this morning; had low grade temperature overnight but otherwise denies chills  No nausea/vomiting, reports tolerating full liquids/toast/crackers well without any associated exacerbations of abdominal pain    Objective:     Vitals: Blood pressure 125/55, pulse 74, temperature 99 °F (37 2 °C), temperature source Tympanic, resp  rate 18, height 5' 1" (1 549 m), weight 73 kg (160 lb 15 oz), SpO2 97 %, not currently breastfeeding  ,Body mass index is 30 41 kg/m²  Intake/Output Summary (Last 24 hours) at 9/26/2019 0913  Last data filed at 9/26/2019 0414  Gross per 24 hour   Intake 2398 ml   Output 600 ml   Net 1798 ml       Physical Exam:   General appearance: alert, appears stated age and cooperative  Lungs: clear to auscultation bilaterally, no labored breathing/accessory muscle use  Heart: regular rate and rhythm, S1, S2 normal, no murmur, click, rub or gallop  Abdomen: soft, mild lower abdominal tenderness without guarding; bowel sounds normal; no masses,  no organomegaly  Extremities: no edema    Invasive Devices     Peripheral Intravenous Line            Peripheral IV 09/25/19 Left Hand 1 day                Lab, Imaging and other studies: I have personally reviewed pertinent reports      Admission on 09/24/2019   Component Date Value    PTT 09/24/2019 28     Protime 09/24/2019 10 0     INR 09/24/2019 0 92     WBC 09/24/2019 8 89     RBC 09/24/2019 4 47     Hemoglobin 09/24/2019 13 0     Hematocrit 09/24/2019 40 2     MCV 09/24/2019 90     MCH 09/24/2019 29 1     MCHC 09/24/2019 32 3     RDW 09/24/2019 12 7     MPV 09/24/2019 12 7     Platelets 16/46/2152 153     nRBC 09/24/2019 0     Neutrophils Relative 09/24/2019 68     Immat GRANS % 09/24/2019 0     Lymphocytes Relative 09/24/2019 21     Monocytes Relative 09/24/2019 10  Eosinophils Relative 09/24/2019 1     Basophils Relative 09/24/2019 0     Neutrophils Absolute 09/24/2019 6 00     Immature Grans Absolute 09/24/2019 0 03     Lymphocytes Absolute 09/24/2019 1 86     Monocytes Absolute 09/24/2019 0 89     Eosinophils Absolute 09/24/2019 0 09     Basophils Absolute 09/24/2019 0 02     Sodium 09/24/2019 139     Potassium 09/24/2019 3 6     Chloride 09/24/2019 102     CO2 09/24/2019 28     ANION GAP 09/24/2019 9     BUN 09/24/2019 9     Creatinine 09/24/2019 0 74     Glucose 09/24/2019 111     Calcium 09/24/2019 8 6     AST 09/24/2019 14     ALT 09/24/2019 27     Alkaline Phosphatase 09/24/2019 92     Total Protein 09/24/2019 7 1     Albumin 09/24/2019 3 9     Total Bilirubin 09/24/2019 0 90     eGFR 09/24/2019 88     LACTIC ACID 09/24/2019 0 6     Color, UA 09/24/2019 Light Yellow     Clarity, UA 09/24/2019 Slightly Cloudy     Specific Gravity, UA 09/24/2019 1 010     pH, UA 09/24/2019 7 0     Leukocytes, UA 09/24/2019 Small*    Nitrite, UA 09/24/2019 Negative     Protein, UA 09/24/2019 Negative     Glucose, UA 09/24/2019 Negative     Ketones, UA 09/24/2019 Negative     Urobilinogen, UA 09/24/2019 0 2     Bilirubin, UA 09/24/2019 Negative     Blood, UA 09/24/2019 Trace-lysed*    RBC, UA 09/24/2019 2-4*    WBC, UA 09/24/2019 4-10*    Epithelial Cells 09/24/2019 Moderate*    Bacteria, UA 09/24/2019 Occasional     WBC Clumps 09/24/2019 present     OTHER OBSERVATIONS 09/24/2019 Renal Epithelial Cells Present     Sodium 09/25/2019 140     Potassium 09/25/2019 3 5     Chloride 09/25/2019 105     CO2 09/25/2019 26     ANION GAP 09/25/2019 9     BUN 09/25/2019 8     Creatinine 09/25/2019 0 70     Glucose 09/25/2019 108     Glucose, Fasting 09/25/2019 108*    Calcium 09/25/2019 8 3     eGFR 09/25/2019 94     WBC 09/25/2019 8 29     RBC 09/25/2019 4 05     Hemoglobin 09/25/2019 11 9     Hematocrit 09/25/2019 36 8     MCV 09/25/2019 91     MCH 09/25/2019 29 4     MCHC 09/25/2019 32 3     RDW 09/25/2019 12 6     MPV 09/25/2019 12 3     Platelets 52/51/5356 142*    nRBC 09/25/2019 0     Neutrophils Relative 09/25/2019 65     Immat GRANS % 09/25/2019 0     Lymphocytes Relative 09/25/2019 24     Monocytes Relative 09/25/2019 10     Eosinophils Relative 09/25/2019 1     Basophils Relative 09/25/2019 0     Neutrophils Absolute 09/25/2019 5 35     Immature Grans Absolute 09/25/2019 0 03     Lymphocytes Absolute 09/25/2019 2 02     Monocytes Absolute 09/25/2019 0 80     Eosinophils Absolute 09/25/2019 0 07     Basophils Absolute 09/25/2019 0 02      Results for Casey Rodriguez (MRN [id number]) as of 9/26/2019 09:12   Ref   Range 9/24/2019 22:03 9/24/2019 22:03 9/24/2019 22:14 9/25/2019 05:17   Sodium Latest Ref Range: 136 - 145 mmol/L 139   140   Potassium Latest Ref Range: 3 5 - 5 3 mmol/L 3 6   3 5   Chloride Latest Ref Range: 100 - 108 mmol/L 102   105   CO2 Latest Ref Range: 21 - 32 mmol/L 28   26   Anion Gap Latest Ref Range: 4 - 13 mmol/L 9   9   BUN Latest Ref Range: 5 - 25 mg/dL 9   8   Creatinine Latest Ref Range: 0 60 - 1 30 mg/dL 0 74   0 70   Glucose, Random Latest Ref Range: 65 - 140 mg/dL 111   108   GLUCOSE FASTING Latest Ref Range: 65 - 99 mg/dL    108 (H)   Calcium Latest Ref Range: 8 3 - 10 1 mg/dL 8 6   8 3   AST Latest Ref Range: 5 - 45 U/L 14      ALT Latest Ref Range: 12 - 78 U/L 27      Alkaline Phosphatase Latest Ref Range: 46 - 116 U/L 92      Total Protein Latest Ref Range: 6 4 - 8 2 g/dL 7 1      Albumin Latest Ref Range: 3 5 - 5 0 g/dL 3 9      TOTAL BILIRUBIN Latest Ref Range: 0 20 - 1 00 mg/dL 0 90      eGFR Latest Units: ml/min/1 73sq m 88   94   LACTIC ACID Latest Ref Range: 0 5 - 2 0 mmol/L 0 6      WBC Latest Ref Range: 4 31 - 10 16 Thousand/uL 8 89   8 29   Red Blood Cell Count Latest Ref Range: 3 81 - 5 12 Million/uL 4 47   4 05   Hemoglobin Latest Ref Range: 11 5 - 15 4 g/dL 13 0   11 9   HCT Latest Ref Range: 34 8 - 46 1 % 40 2   36 8   MCV Latest Ref Range: 82 - 98 fL 90   91   MCH Latest Ref Range: 26 8 - 34 3 pg 29 1   29 4   MCHC Latest Ref Range: 31 4 - 37 4 g/dL 32 3   32 3   RDW Latest Ref Range: 11 6 - 15 1 % 12 7   12 6   Platelet Count Latest Ref Range: 149 - 390 Thousands/uL 153   142 (L)   MPV Latest Ref Range: 8 9 - 12 7 fL 12 7   12 3   nRBC Latest Units: /100 WBCs 0   0   Neutrophils % Latest Ref Range: 43 - 75 % 68   65   Immat GRANS % Latest Ref Range: 0 - 2 % 0   0   Lymphocytes Relative Latest Ref Range: 14 - 44 % 21   24   Monocytes Relative Latest Ref Range: 4 - 12 % 10   10   Eosinophils Latest Ref Range: 0 - 6 % 1   1   Basophils Relative Latest Ref Range: 0 - 1 % 0   0   Immature Grans Absolute Latest Ref Range: 0 00 - 0 20 Thousand/uL 0 03   0 03   Absolute Neutrophils Latest Ref Range: 1 85 - 7 62 Thousands/µL 6 00   5 35   Lymphocytes Absolute Latest Ref Range: 0 60 - 4 47 Thousands/µL 1 86   2 02   Absolute Monocytes Latest Ref Range: 0 17 - 1 22 Thousand/µL 0 89   0 80   Absolute Eosinophils Latest Ref Range: 0 00 - 0 61 Thousand/µL 0 09   0 07   Basophils Absolute Latest Ref Range: 0 00 - 0 10 Thousands/µL 0 02   0 02   WBC CLUMPS Unknown   present    Protime Latest Ref Range: 9 8 - 12 0 seconds 10 0      INR Latest Ref Range: 0 91 - 1 09  0 92      PTT Latest Ref Range: 23 - 37 seconds 28      Epithelial Cells Latest Ref Range: None Seen, Occasional /hpf   Moderate (A)    Color, UA Unknown   Light Yellow    Clarity, UA Unknown   Slightly Cloudy    SL AMB SPECIFIC GRAVITY_URINE Latest Ref Range: 1 000 - 1 030    1 010    Glucose, UA Latest Ref Range: Negative mg/dl   Negative    Ketones, UA Latest Ref Range: Negative mg/dl   Negative    Blood, UA Latest Ref Range: Negative    Trace-lysed (A)    Nitrite, UA Latest Ref Range: Negative    Negative    Leukocytes, UA Latest Ref Range: Negative    Small (A)    pH, UA Latest Ref Range: 5 0, 5 5, 6 0, 6 5, 7 0, 7 5, 8 0, 8 5, 9 0 7  0    POCT URINE PROTEIN Latest Ref Range: Negative mg/dl   Negative    Bilirubin, UA Latest Ref Range: Negative    Negative    SL AMB POCT UROBILINOGEN Latest Ref Range: 0 2, 1 0 E U /dl E U /dl   0 2    RBC, UA Latest Ref Range: None Seen, 0-5 /hpf   2-4 (A)    WBC, UA Latest Ref Range: None Seen, 0-5, 5-55, 5-65 /hpf   4-10 (A)    Bacteria, UA Latest Ref Range: None Seen, Occasional /hpf   Occasional    OTHER OBSERVATIONS Unknown   Renal Epithelial Cells Present    BLOOD CULTURE Unknown Rpt ((NONE)) Rpt ((NONE))     MRSA CULTURE Unknown    Rpt ((NONE))       Assessment/Plan:    1  Acute uncomplicated sigmoid diverticulitis, patient's 3rd known occurence since ~2015    Appears clinically improving at this time, no significant leukocytosis/bandemia, somewhat less tender on exam today    - advance to soft low residue diet at this time  - monitor abdominal exam, temperature, WBC count  - continue IV antibiotics  - defer to her outpatient GI physician with regards to timing of next colonoscopy; she reports having had one done within the last year  - I discussed this patient's case and plan with Joan Arellano ([facility])

## 2019-09-27 LAB — BACTERIA UR CULT: NORMAL

## 2019-09-28 LAB
ATRIAL RATE: 79 BPM
P AXIS: 46 DEGREES
PR INTERVAL: 186 MS
QRS AXIS: 39 DEGREES
QRSD INTERVAL: 92 MS
QT INTERVAL: 380 MS
QTC INTERVAL: 435 MS
T WAVE AXIS: 23 DEGREES
VENTRICULAR RATE: 79 BPM

## 2019-09-28 PROCEDURE — 93010 ELECTROCARDIOGRAM REPORT: CPT | Performed by: INTERNAL MEDICINE

## 2019-09-30 LAB
BACTERIA BLD CULT: NORMAL
BACTERIA BLD CULT: NORMAL

## 2019-10-10 ENCOUNTER — HOSPITAL ENCOUNTER (INPATIENT)
Facility: HOSPITAL | Age: 59
LOS: 3 days | Discharge: HOME | DRG: 392 | End: 2019-10-14
Attending: EMERGENCY MEDICINE | Admitting: STUDENT IN AN ORGANIZED HEALTH CARE EDUCATION/TRAINING PROGRAM
Payer: COMMERCIAL

## 2019-10-10 ENCOUNTER — APPOINTMENT (EMERGENCY)
Dept: RADIOLOGY | Facility: HOSPITAL | Age: 59
DRG: 392 | End: 2019-10-10
Payer: COMMERCIAL

## 2019-10-10 DIAGNOSIS — K57.92 DIVERTICULITIS: ICD-10-CM

## 2019-10-10 DIAGNOSIS — K57.92 ACUTE DIVERTICULITIS: ICD-10-CM

## 2019-10-10 DIAGNOSIS — R10.9 ABDOMINAL PAIN: Primary | ICD-10-CM

## 2019-10-10 LAB
ALBUMIN SERPL BCP-MCNC: 3.6 G/DL (ref 3.5–5)
ALP SERPL-CCNC: 84 U/L (ref 46–116)
ALT SERPL W P-5'-P-CCNC: <6 U/L (ref 12–78)
ANION GAP SERPL CALCULATED.3IONS-SCNC: 5 MMOL/L (ref 4–13)
AST SERPL W P-5'-P-CCNC: 9 U/L (ref 5–45)
BACTERIA UR QL AUTO: ABNORMAL /HPF
BASOPHILS # BLD AUTO: 0.02 THOUSANDS/ΜL (ref 0–0.1)
BASOPHILS NFR BLD AUTO: 0 % (ref 0–1)
BILIRUB SERPL-MCNC: 0.7 MG/DL (ref 0.2–1)
BILIRUB UR QL STRIP: ABNORMAL
BUN SERPL-MCNC: 7 MG/DL (ref 5–25)
CALCIUM SERPL-MCNC: 8.8 MG/DL (ref 8.3–10.1)
CHLORIDE SERPL-SCNC: 104 MMOL/L (ref 100–108)
CLARITY UR: CLEAR
CO2 SERPL-SCNC: 29 MMOL/L (ref 21–32)
COLOR UR: YELLOW
CREAT SERPL-MCNC: 0.7 MG/DL (ref 0.6–1.3)
EOSINOPHIL # BLD AUTO: 0.13 THOUSAND/ΜL (ref 0–0.61)
EOSINOPHIL NFR BLD AUTO: 2 % (ref 0–6)
ERYTHROCYTE [DISTWIDTH] IN BLOOD BY AUTOMATED COUNT: 12.9 % (ref 11.6–15.1)
GFR SERPL CREATININE-BSD FRML MDRD: 94 ML/MIN/1.73SQ M
GLUCOSE SERPL-MCNC: 137 MG/DL (ref 65–140)
GLUCOSE UR STRIP-MCNC: NEGATIVE MG/DL
HCT VFR BLD AUTO: 39 % (ref 34.8–46.1)
HGB BLD-MCNC: 12.9 G/DL (ref 11.5–15.4)
HGB UR QL STRIP.AUTO: ABNORMAL
KETONES UR STRIP-MCNC: NEGATIVE MG/DL
LACTATE SERPL-SCNC: 0.6 MMOL/L (ref 0.5–2)
LEUKOCYTE ESTERASE UR QL STRIP: ABNORMAL
LIPASE SERPL-CCNC: 83 U/L (ref 73–393)
LYMPHOCYTES # BLD AUTO: 1.98 THOUSANDS/ΜL (ref 0.6–4.47)
LYMPHOCYTES NFR BLD AUTO: 22 % (ref 14–44)
MCH RBC QN AUTO: 29.8 PG (ref 26.8–34.3)
MCHC RBC AUTO-ENTMCNC: 33.1 G/DL (ref 31.4–37.4)
MCV RBC AUTO: 90 FL (ref 82–98)
MONOCYTES # BLD AUTO: 0.86 THOUSAND/ΜL (ref 0.17–1.22)
MONOCYTES NFR BLD AUTO: 10 % (ref 4–12)
MUCOUS THREADS UR QL AUTO: ABNORMAL
NEUTROPHILS # BLD AUTO: 5.93 THOUSANDS/ΜL (ref 1.85–7.62)
NEUTS SEG NFR BLD AUTO: 66 % (ref 43–75)
NITRITE UR QL STRIP: NEGATIVE
NON-SQ EPI CELLS URNS QL MICRO: ABNORMAL /HPF
PH UR STRIP.AUTO: 6.5 [PH]
PLATELET # BLD AUTO: 160 THOUSANDS/UL (ref 149–390)
PMV BLD AUTO: 13 FL (ref 8.9–12.7)
POTASSIUM SERPL-SCNC: 3.6 MMOL/L (ref 3.5–5.3)
PROT SERPL-MCNC: 6.9 G/DL (ref 6.4–8.2)
PROT UR STRIP-MCNC: NEGATIVE MG/DL
RBC # BLD AUTO: 4.33 MILLION/UL (ref 3.81–5.12)
RBC #/AREA URNS AUTO: ABNORMAL /HPF
SODIUM SERPL-SCNC: 138 MMOL/L (ref 136–145)
SP GR UR STRIP.AUTO: 1.01 (ref 1–1.03)
UROBILINOGEN UR QL STRIP.AUTO: 0.2 E.U./DL
WBC # BLD AUTO: 8.92 THOUSAND/UL (ref 4.31–10.16)
WBC #/AREA URNS AUTO: ABNORMAL /HPF

## 2019-10-10 PROCEDURE — 36415 COLL VENOUS BLD VENIPUNCTURE: CPT | Performed by: EMERGENCY MEDICINE

## 2019-10-10 PROCEDURE — 99285 EMERGENCY DEPT VISIT HI MDM: CPT

## 2019-10-10 PROCEDURE — 96375 TX/PRO/DX INJ NEW DRUG ADDON: CPT

## 2019-10-10 PROCEDURE — 96374 THER/PROPH/DIAG INJ IV PUSH: CPT

## 2019-10-10 PROCEDURE — 83605 ASSAY OF LACTIC ACID: CPT | Performed by: EMERGENCY MEDICINE

## 2019-10-10 PROCEDURE — 80053 COMPREHEN METABOLIC PANEL: CPT | Performed by: EMERGENCY MEDICINE

## 2019-10-10 PROCEDURE — 74176 CT ABD & PELVIS W/O CONTRAST: CPT

## 2019-10-10 PROCEDURE — 81001 URINALYSIS AUTO W/SCOPE: CPT | Performed by: EMERGENCY MEDICINE

## 2019-10-10 PROCEDURE — 96361 HYDRATE IV INFUSION ADD-ON: CPT

## 2019-10-10 PROCEDURE — 83690 ASSAY OF LIPASE: CPT | Performed by: EMERGENCY MEDICINE

## 2019-10-10 PROCEDURE — 85025 COMPLETE CBC W/AUTO DIFF WBC: CPT | Performed by: EMERGENCY MEDICINE

## 2019-10-10 PROCEDURE — 87040 BLOOD CULTURE FOR BACTERIA: CPT | Performed by: EMERGENCY MEDICINE

## 2019-10-10 RX ORDER — ONDANSETRON 2 MG/ML
4 INJECTION INTRAMUSCULAR; INTRAVENOUS ONCE
Status: COMPLETED | OUTPATIENT
Start: 2019-10-10 | End: 2019-10-10

## 2019-10-10 RX ADMIN — MORPHINE SULFATE 2 MG: 2 INJECTION, SOLUTION INTRAMUSCULAR; INTRAVENOUS at 23:42

## 2019-10-10 RX ADMIN — ONDANSETRON 4 MG: 2 INJECTION INTRAMUSCULAR; INTRAVENOUS at 22:29

## 2019-10-10 RX ADMIN — SODIUM CHLORIDE 1000 ML: 0.9 INJECTION, SOLUTION INTRAVENOUS at 22:29

## 2019-10-10 NOTE — LETTER
[address] [facility] [address]  [place] [address]  Dept: [phone]    October 14, 2019     Patient: Pedrito Ho   YOB: 1960   Date of Visit: 10/10/2019       To Whom it May Concern:    Pedrito Ho is under my professional care  She was seen in the hospital from 10/10/2019   to 10/14/19  She may return to work on 10/21/19    If you have any questions or concerns, please don't hesitate to call           Sincerely,          Edward Peñaloza, DO

## 2019-10-11 PROBLEM — N39.0 UTI (URINARY TRACT INFECTION): Status: ACTIVE | Noted: 2019-10-11

## 2019-10-11 LAB
ANION GAP SERPL CALCULATED.3IONS-SCNC: 4 MMOL/L (ref 4–13)
BASOPHILS # BLD AUTO: 0.02 THOUSANDS/ΜL (ref 0–0.1)
BASOPHILS NFR BLD AUTO: 0 % (ref 0–1)
BUN SERPL-MCNC: 6 MG/DL (ref 5–25)
CALCIUM SERPL-MCNC: 8.5 MG/DL (ref 8.3–10.1)
CHLORIDE SERPL-SCNC: 108 MMOL/L (ref 100–108)
CO2 SERPL-SCNC: 30 MMOL/L (ref 21–32)
CREAT SERPL-MCNC: 0.74 MG/DL (ref 0.6–1.3)
EOSINOPHIL # BLD AUTO: 0.11 THOUSAND/ΜL (ref 0–0.61)
EOSINOPHIL NFR BLD AUTO: 2 % (ref 0–6)
ERYTHROCYTE [DISTWIDTH] IN BLOOD BY AUTOMATED COUNT: 12.6 % (ref 11.6–15.1)
GFR SERPL CREATININE-BSD FRML MDRD: 88 ML/MIN/1.73SQ M
GLUCOSE SERPL-MCNC: 96 MG/DL (ref 65–140)
HCT VFR BLD AUTO: 37 % (ref 34.8–46.1)
HGB BLD-MCNC: 11.4 G/DL (ref 11.5–15.4)
HOLD SPECIMEN: NORMAL
IMM GRANULOCYTES # BLD AUTO: 0.03 THOUSAND/UL (ref 0–0.2)
IMM GRANULOCYTES NFR BLD AUTO: 0 % (ref 0–2)
LYMPHOCYTES # BLD AUTO: 1.9 THOUSANDS/ΜL (ref 0.6–4.47)
LYMPHOCYTES NFR BLD AUTO: 27 % (ref 14–44)
MCH RBC QN AUTO: 28.5 PG (ref 26.8–34.3)
MCHC RBC AUTO-ENTMCNC: 30.8 G/DL (ref 31.4–37.4)
MCV RBC AUTO: 93 FL (ref 82–98)
MONOCYTES # BLD AUTO: 0.71 THOUSAND/ΜL (ref 0.17–1.22)
MONOCYTES NFR BLD AUTO: 10 % (ref 4–12)
NEUTROPHILS # BLD AUTO: 4.23 THOUSANDS/ΜL (ref 1.85–7.62)
NEUTS SEG NFR BLD AUTO: 61 % (ref 43–75)
NRBC BLD AUTO-RTO: 0 /100 WBCS
PLATELET # BLD AUTO: 146 THOUSANDS/UL (ref 149–390)
PMV BLD AUTO: 13 FL (ref 8.9–12.7)
POTASSIUM SERPL-SCNC: 3.8 MMOL/L (ref 3.5–5.3)
RBC # BLD AUTO: 4 MILLION/UL (ref 3.81–5.12)
SODIUM SERPL-SCNC: 142 MMOL/L (ref 136–145)
WBC # BLD AUTO: 7 THOUSAND/UL (ref 4.31–10.16)

## 2019-10-11 PROCEDURE — 80048 BASIC METABOLIC PNL TOTAL CA: CPT | Performed by: STUDENT IN AN ORGANIZED HEALTH CARE EDUCATION/TRAINING PROGRAM

## 2019-10-11 PROCEDURE — 99223 1ST HOSP IP/OBS HIGH 75: CPT | Performed by: INTERNAL MEDICINE

## 2019-10-11 PROCEDURE — 94760 N-INVAS EAR/PLS OXIMETRY 1: CPT

## 2019-10-11 PROCEDURE — 87081 CULTURE SCREEN ONLY: CPT | Performed by: STUDENT IN AN ORGANIZED HEALTH CARE EDUCATION/TRAINING PROGRAM

## 2019-10-11 PROCEDURE — 85025 COMPLETE CBC W/AUTO DIFF WBC: CPT | Performed by: STUDENT IN AN ORGANIZED HEALTH CARE EDUCATION/TRAINING PROGRAM

## 2019-10-11 PROCEDURE — 99223 1ST HOSP IP/OBS HIGH 75: CPT | Performed by: STUDENT IN AN ORGANIZED HEALTH CARE EDUCATION/TRAINING PROGRAM

## 2019-10-11 PROCEDURE — 87086 URINE CULTURE/COLONY COUNT: CPT | Performed by: STUDENT IN AN ORGANIZED HEALTH CARE EDUCATION/TRAINING PROGRAM

## 2019-10-11 RX ORDER — ALBUTEROL SULFATE 2.5 MG/3ML
2.5 SOLUTION RESPIRATORY (INHALATION) EVERY 6 HOURS PRN
Status: DISCONTINUED | OUTPATIENT
Start: 2019-10-11 | End: 2019-10-14 | Stop reason: HOSPADM

## 2019-10-11 RX ORDER — PROMETHAZINE HYDROCHLORIDE 25 MG/ML
12.5 INJECTION, SOLUTION INTRAMUSCULAR; INTRAVENOUS ONCE
Status: COMPLETED | OUTPATIENT
Start: 2019-10-11 | End: 2019-10-11

## 2019-10-11 RX ORDER — ONDANSETRON 2 MG/ML
4 INJECTION INTRAMUSCULAR; INTRAVENOUS EVERY 6 HOURS PRN
Status: DISCONTINUED | OUTPATIENT
Start: 2019-10-11 | End: 2019-10-14 | Stop reason: HOSPADM

## 2019-10-11 RX ORDER — LEVOTHYROXINE SODIUM 0.1 MG/1
112 TABLET ORAL DAILY
COMMUNITY

## 2019-10-11 RX ORDER — FLUTICASONE FUROATE AND VILANTEROL 100; 25 UG/1; UG/1
1 POWDER RESPIRATORY (INHALATION)
Status: DISCONTINUED | OUTPATIENT
Start: 2019-10-11 | End: 2019-10-14 | Stop reason: HOSPADM

## 2019-10-11 RX ORDER — SODIUM CHLORIDE 9 MG/ML
75 INJECTION, SOLUTION INTRAVENOUS CONTINUOUS
Status: DISCONTINUED | OUTPATIENT
Start: 2019-10-11 | End: 2019-10-14 | Stop reason: HOSPADM

## 2019-10-11 RX ORDER — LEVOTHYROXINE SODIUM 112 UG/1
112 TABLET ORAL DAILY
COMMUNITY
End: 2019-10-14 | Stop reason: HOSPADM

## 2019-10-11 RX ORDER — LEVOTHYROXINE SODIUM 0.12 MG/1
125 TABLET ORAL
Status: DISCONTINUED | OUTPATIENT
Start: 2019-10-11 | End: 2019-10-14 | Stop reason: HOSPADM

## 2019-10-11 RX ORDER — HEPARIN SODIUM 5000 [USP'U]/ML
5000 INJECTION, SOLUTION INTRAVENOUS; SUBCUTANEOUS EVERY 8 HOURS SCHEDULED
Status: DISCONTINUED | OUTPATIENT
Start: 2019-10-11 | End: 2019-10-12

## 2019-10-11 RX ADMIN — SODIUM CHLORIDE 75 ML/HR: 0.9 INJECTION, SOLUTION INTRAVENOUS at 08:33

## 2019-10-11 RX ADMIN — HEPARIN SODIUM 5000 UNITS: 5000 INJECTION INTRAVENOUS; SUBCUTANEOUS at 06:17

## 2019-10-11 RX ADMIN — HEPARIN SODIUM 5000 UNITS: 5000 INJECTION INTRAVENOUS; SUBCUTANEOUS at 13:14

## 2019-10-11 RX ADMIN — PIPERACILLIN SODIUM AND TAZOBACTAM SODIUM 3.38 G: 36; 4.5 INJECTION, POWDER, FOR SOLUTION INTRAVENOUS at 18:10

## 2019-10-11 RX ADMIN — PIPERACILLIN SODIUM,TAZOBACTAM SODIUM 4.5 G: 4; .5 INJECTION, POWDER, FOR SOLUTION INTRAVENOUS at 00:51

## 2019-10-11 RX ADMIN — PROMETHAZINE HYDROCHLORIDE 12.5 MG: 25 INJECTION INTRAMUSCULAR; INTRAVENOUS at 00:51

## 2019-10-11 RX ADMIN — HEPARIN SODIUM 5000 UNITS: 5000 INJECTION INTRAVENOUS; SUBCUTANEOUS at 21:28

## 2019-10-11 RX ADMIN — LEVOTHYROXINE SODIUM 125 MCG: 125 TABLET ORAL at 06:17

## 2019-10-11 RX ADMIN — PIPERACILLIN SODIUM AND TAZOBACTAM SODIUM 3.38 G: 36; 4.5 INJECTION, POWDER, FOR SOLUTION INTRAVENOUS at 07:14

## 2019-10-11 RX ADMIN — PIPERACILLIN SODIUM AND TAZOBACTAM SODIUM 3.38 G: 36; 4.5 INJECTION, POWDER, FOR SOLUTION INTRAVENOUS at 13:14

## 2019-10-11 RX ADMIN — FLUTICASONE FUROATE AND VILANTEROL TRIFENATATE 1 PUFF: 100; 25 POWDER RESPIRATORY (INHALATION) at 08:30

## 2019-10-11 RX ADMIN — SODIUM CHLORIDE 75 ML/HR: 0.9 INJECTION, SOLUTION INTRAVENOUS at 02:14

## 2019-10-11 NOTE — PLAN OF CARE
Problem: PAIN - ADULT  Goal: Verbalizes/displays adequate comfort level or baseline comfort level  Description  Interventions:  - Encourage patient to monitor pain and request assistance  - Assess pain using appropriate pain scale  - Administer analgesics based on type and severity of pain and evaluate response  - Implement non-pharmacological measures as appropriate and evaluate response  - Consider cultural and social influences on pain and pain management  - Notify physician/advanced practitioner if interventions unsuccessful or patient reports new pain  Outcome: Progressing     Problem: INFECTION - ADULT  Goal: Absence or prevention of progression during hospitalization  Description  INTERVENTIONS:  - Assess and monitor for signs and symptoms of infection  - Monitor lab/diagnostic results  - Monitor all insertion sites, i e  indwelling lines, tubes, and drains  - [place] appropriate cooling/warming therapies per order  - Administer medications as ordered  - Instruct and encourage patient and family to use good hand hygiene technique  - Identify and instruct in appropriate isolation precautions for identified infection/condition   Outcome: Progressing  Goal: Absence of fever/infection during neutropenic period  Description  INTERVENTIONS:  - Monitor WBC    Outcome: Completed     Problem: SAFETY ADULT  Goal: Patient will remain free of falls  Description  INTERVENTIONS:  - Assess patient frequently for physical needs  -  Identify cognitive and physical deficits and behaviors that affect risk of falls    -  [place] fall precautions as indicated by assessment   - Educate patient/family on patient safety including physical limitations  - Instruct patient to call for assistance with activity based on assessment  - Modify environment to reduce risk of injury  - Consider OT/PT consult to assist with strengthening/mobility  Outcome: Progressing  Goal: Maintain or return to baseline ADL function  Description  INTERVENTIONS:  -  Assess patient's ability to carry out ADLs; assess patient's baseline for ADL function and identify physical deficits which impact ability to perform ADLs (bathing, care of mouth/teeth, toileting, grooming, dressing, etc )  - Assess/evaluate cause of self-care deficits   - Assess range of motion  - Assess patient's mobility; develop plan if impaired  - Assess patient's need for assistive devices and provide as appropriate  - Encourage maximum independence but intervene and supervise when necessary  - Involve family in performance of ADLs  - Assess for home care needs following discharge   - Consider OT consult to assist with ADL evaluation and planning for discharge  - Provide patient education as appropriate  Outcome: Progressing  Goal: Maintain or return mobility status to optimal level  Description  INTERVENTIONS:  - Assess patient's baseline mobility status (ambulation, transfers, stairs, etc )    - Identify cognitive and physical deficits and behaviors that affect mobility  - Identify mobility aids required to assist with transfers and/or ambulation (gait belt, sit-to-stand, lift, walker, cane, etc )  - [place] fall precautions as indicated by assessment  - Record patient progress and toleration of activity level on Mobility SBAR; progress patient to next Phase/Stage  - Instruct patient to call for assistance with activity based on assessment  - Consider rehabilitation consult to assist with strengthening/weightbearing, etc   Outcome: Progressing     Problem: DISCHARGE PLANNING  Goal: Discharge to home or other facility with appropriate resources  Description  INTERVENTIONS:  - Identify barriers to discharge w/patient and caregiver  - Arrange for needed discharge resources and transportation as appropriate  - Identify discharge learning needs (meds, wound care, etc )  - Arrange for interpretive services to assist at discharge as needed  - Refer to Case Management Department for coordinating discharge planning if the patient needs post-hospital services based on physician/advanced practitioner order or complex needs related to functional status, cognitive ability, or social support system  Outcome: Progressing     Problem: Knowledge Deficit  Goal: Patient/family/caregiver demonstrates understanding of disease process, treatment plan, medications, and discharge instructions  Description  Complete learning assessment and assess knowledge base    Interventions:  - Provide teaching at level of understanding  - Provide teaching via preferred learning methods  Outcome: Progressing

## 2019-10-11 NOTE — ASSESSMENT & PLAN NOTE
CT scan showed findings suggestive of diverticulitis at the splenic flexure  No drainable abscess or perforation was noted  Enlarged lymph node was noted which has been present since 2016  Pt was recently treated for Diverticulitis and reported complete resolution however symptoms recurred several days ago  She was given Zosyn here and transitioned to Augmentin on discharge  Blood cultures negative  Patient afebrile with no leukocytosis  She was on IV hydration    Tolerated low-fiber/low residue diet by day of discharge  Patient can follow up with own gastroenterologist after discharge for repeat colonoscopy

## 2019-10-11 NOTE — UTILIZATION REVIEW
Initial Clinical Review    Admission: Date/Time/Statement: Inpatient Admission Orders (From admission, onward)     Ordered        10/11/19 0028  Inpatient Admission (expected length of stay for this patient Order details is greater than two midnights)  Once                   Orders Placed This Encounter   Procedures    Inpatient Admission (expected length of stay for this patient Order details is greater than two midnights)     Standing Status:   Standing     Number of Occurrences:   1     Order Specific Question:   Admitting Physician     Answer:   Melina Lyons [[id number]]     Order Specific Question:   Level of Care     Answer:   Med Surg [[id number]]     Order Specific Question:   Estimated length of stay     Answer:   More than 2 Midnights     Order Specific Question:   Certification     Answer:   I certify that inpatient services are medically necessary for this patient for a duration of greater than two midnights  See H&P and MD Progress Notes for additional information about the patient's course of treatment  ED Arrival Information     Expected Arrival Acuity Means of Arrival Escorted By Service Admission Type    - 10/10/2019 21:50 Urgent Walk-In Spouse Hospitalist Urgent    Arrival Complaint    abdominal pain        Chief Complaint   Patient presents with    Abdominal Pain     pt presents to the ed with abdominal pain for several days  pt states that it feels like her diverticulitis acting up      Assessment/Plan:   61 YO FEMALE AMBULATORY TO ER FOR C/O ABD PAIN  HX ASTHMA,  DIVERTICULITIS, SLEEP APNEA  RECENT ADMISSION FOR DIVERTICULITIS, COMPLETED HOME ABT 4 DAYS AGO  PRESENT TODAY WITH LOWER ABD PAIN RADIATING TO L FLANK, LUQ, LLQ & SUPRAPUBIC TENDERNESS WITH ASSOCIATED FEVER, NAUSEA & DIARRHEA  ADMISSION WORK-UP SHOWING +U/A, CT +DIVERTICULITIS  ADMITTED TO INPATIENT STATUS FOR ACUTE DIVERTICULITIS, STARTED ON IVABT & IVF, GI CONSULTED     ED Triage Vitals   Temperature Pulse Respirations Blood Pressure SpO2 10/10/19 2204 10/10/19 2204 10/10/19 2204 10/10/19 2204 10/10/19 2204   99 4 °F (37 4 °C) 103 18 129/81 98 %      Temp Source Heart Rate Source Patient Position - Orthostatic VS BP Location FiO2 (%)   10/10/19 2204 10/10/19 2204 10/11/19 0117 10/11/19 0117 --   Tympanic Monitor Lying Right arm       Pain Score       10/10/19 2342       9        Wt Readings from Last 1 Encounters:   10/11/19 71 3 kg (157 lb 3 oz)     Additional Vital Signs:   Date/Time  Temp  Pulse  Resp  BP  MAP (mmHg)  SpO2  O2 Device  Patient Position - Orthostatic VS   10/11/19 08:31:21  98 4 °F (36 9 °C)  83  --  99/62  74  97 %  --  --   10/11/19 03:59:55  98 9 °F (37 2 °C)  71  18  98/64  75  93 %  None (Room air)  Lying   10/11/19 01:17:33  100 °F (37 8 °C)  85  18  116/68  84  95 %  None (Room air)  Lying   10/10/19 2215  --  --  --  129/81  --  --  --  --   10/10/19 2204  99 4 °F (37 4 °C)  103  18  129/81  --  98 %  --  --   Pertinent Labs/Diagnostic Test Results:   Results from last 7 days   Lab Units 10/11/19  0606 10/10/19  2223   WBC Thousand/uL 7 00 8 92   HEMOGLOBIN g/dL 11 4* 12 9   HEMATOCRIT % 37 0 39 0   PLATELETS Thousands/uL 146* 160   NEUTROS ABS Thousands/µL 4 23 5 93     Results from last 7 days   Lab Units 10/11/19  0606 10/10/19  2223   SODIUM mmol/L 142 138   POTASSIUM mmol/L 3 8 3 6   CHLORIDE mmol/L 108 104   CO2 mmol/L 30 29   ANION GAP mmol/L 4 5   BUN mg/dL 6 7   CREATININE mg/dL 0 74 0 70   EGFR ml/min/1 73sq m 88 94   CALCIUM mg/dL 8 5 8 8     Results from last 7 days   Lab Units 10/10/19  2223   AST U/L 9   ALT U/L <6*   ALK PHOS U/L 84   TOTAL PROTEIN g/dL 6 9   ALBUMIN g/dL 3 6   TOTAL BILIRUBIN mg/dL 0 70     Results from last 7 days   Lab Units 10/11/19  0606 10/10/19  2223   GLUCOSE RANDOM mg/dL 96 137     Results from last 7 days   Lab Units 10/10/19  2231   LACTIC ACID mmol/L 0 6     Results from last 7 days   Lab Units 10/10/19  2223   LIPASE u/L 83     Results from last 7 days   Lab Units 10/10/19  2238 CLARITY UA  Clear   COLOR UA  Yellow   SPEC GRAV UA  1 015   PH UA  6 5   GLUCOSE UA mg/dl Negative   KETONES UA mg/dl Negative   BLOOD UA  Moderate*   PROTEIN UA mg/dl Negative   NITRITE UA  Negative   BILIRUBIN UA  Interference- unable to analyze*   UROBILINOGEN UA E U /dl 0 2   LEUKOCYTES UA  Moderate*   WBC UA /hpf 10-20*   RBC UA /hpf 1-2*   BACTERIA UA /hpf Moderate*   EPITHELIAL CELLS WET PREP /hpf Moderate*   MUCUS THREADS  Moderate*     CT A/P=Focal thickening and surrounding inflammatory change at the splenic flexure compatible with diverticulitis  No discrete fluid collection to suggest drainable abscess  No evidence of macro perforation  There is an adjacent enlarged lymph node present  since 2016 however colonoscopy is recommended to exclude underlying colon neoplasm if not already performed    ED Treatment:   Medication Administration from 10/10/2019 2150 to 10/11/2019 0111       Date/Time Order Dose Route     10/10/2019 2229 ondansetron (ZOFRAN) injection 4 mg 4 mg Intravenous     10/10/2019 2229 sodium chloride 0 9 % bolus 1,000 mL 1,000 mL Intravenous     10/10/2019 2342 morphine injection 2 mg 2 mg Intravenous     10/11/2019 0051 piperacillin-tazobactam (ZOSYN) IVPB 4 5 g 4 5 g Intravenous     10/11/2019 0051 promethazine (PHENERGAN) injection 12 5 mg 12 5 mg Intravenous        Past Medical History:   Diagnosis Date    Asthma     Disease of thyroid gland     Diverticulitis     Sleep apnea      Present on Admission:   Acute diverticulitis   Hypothyroid   Asthma  Admitting Diagnosis: Diverticulitis [K57 92]  Abdominal pain [R10 9]  Age/Sex: 62 y o  female  Admission Orders:  MED SURG  CONSULT GI  NPO  Medications:  fluticasone-vilanterol 1 puff Inhalation Daily   heparin (porcine) 5,000 Units Subcutaneous Q8H [address]   levothyroxine 125 mcg Oral Early Morning   piperacillin-tazobactam 3 375 g Intravenous Q6H     sodium chloride 75 mL/hr Intravenous Continuous     albuterol 2 5 mg Nebulization Q6H PRN   morphine injection 2 mg Intravenous Q4H PRN   ondansetron 4 mg Intravenous Q6H PRN     Network Utilization Review Department  Phone: [phone]; Fax [phone]  [email or web address]  ATTENTION: Please call with any questions or concerns to [phone] and carefully listen to the prompts so that you are directed to the right person  Send all requests for admission clinical reviews, approved or denied determinations and any other requests to fax [phone]   All voicemails are confidential

## 2019-10-11 NOTE — ED PROVIDER NOTES
History  Chief Complaint   Patient presents with    Abdominal Pain     pt presents to the ed with abdominal pain for several days  pt states that it feels like her diverticulitis acting up      63 yo female with recent diverticulitis diagnosed , was admitted for 2 days, did well, completed abx about 4 days ago  Started with recurrent pain across lower abdomen and left flank 5 days ago  Pain off and on, worsening   + fever 100 4 at home today, took advil  PMD told her to come to ER   + associated nausea, no vomiting   + slight diarrhea  Does have h/o kidney stone as well  History provided by:  Patient   used: No    Abdominal Pain   Associated symptoms: nausea    Associated symptoms: no chest pain, no cough, no diarrhea, no dysuria, no fever, no shortness of breath and no vomiting        Prior to Admission Medications   Prescriptions Last Dose Informant Patient Reported? Taking? albuterol (2 5 mg/3 mL) 0 083 % nebulizer solution   Yes No   Sig: Take 2 5 mg by nebulization every 6 (six) hours as needed for wheezing   fluticasone-salmeterol (ADVAIR DISKUS, WIXELA INHUB) 250-50 mcg/dose inhaler  Self Yes No   Sig: Inhale 1 puff 2 (two) times a day Rinse mouth after use  levothyroxine 125 mcg tablet  Self Yes No   Sig: Take 125 mcg by mouth daily Taken q Saturday and    other medication, see sig,  Self Yes No   Sig: Take 1 tablet by mouth daily Medication/product name: Remi Chester  Strength:   Sig (include dose, route, frequency):1 tablet daily      Facility-Administered Medications: None       Past Medical History:   Diagnosis Date    Asthma     Disease of thyroid gland     Diverticulitis     Sleep apnea        Past Surgical History:   Procedure Laterality Date     SECTION      x 3    CHOLECYSTECTOMY      KNEE SURGERY Left        History reviewed  No pertinent family history  I have reviewed and agree with the history as documented      Social History     Tobacco Use    Smoking status: Former Smoker     Types: Cigarettes     Last attempt to quit: 1985     Years since quittin 7    Smokeless tobacco: Never Used   Substance Use Topics    Alcohol use: Yes     Alcohol/week: 2 0 standard drinks     Types: 2 Cans of beer per week     Frequency: Monthly or less     Drinks per session: 1 or 2     Binge frequency: Never    Drug use: Never        Review of Systems   Constitutional: Negative  Negative for fever  HENT: Negative  Eyes: Negative  Respiratory: Negative  Negative for cough and shortness of breath  Cardiovascular: Negative  Negative for chest pain  Gastrointestinal: Positive for abdominal pain and nausea  Negative for diarrhea and vomiting  Genitourinary: Negative  Negative for dysuria and flank pain  Musculoskeletal: Negative  Negative for back pain and myalgias  Skin: Negative  Negative for rash  Neurological: Negative  Negative for dizziness and headaches  Hematological: Does not bruise/bleed easily  Psychiatric/Behavioral: Negative  All other systems reviewed and are negative  Physical Exam  Physical Exam   Constitutional: She appears well-developed and well-nourished  Non-toxic appearance  She does not appear ill  No distress  HENT:   Head: Normocephalic and atraumatic  Eyes: Pupils are equal, round, and reactive to light  Conjunctivae are normal    Neck: Normal range of motion  Neck supple  Cardiovascular: Normal rate, regular rhythm and normal heart sounds  No murmur heard  Pulmonary/Chest: Effort normal and breath sounds normal  No respiratory distress  Abdominal: Soft  Bowel sounds are normal  She exhibits no distension  There is tenderness in the suprapubic area, left upper quadrant and left lower quadrant  Musculoskeletal: Normal range of motion  She exhibits no edema or deformity  Neurological: She is alert  No cranial nerve deficit  Skin: Skin is warm and dry  No rash noted   She is not diaphoretic  No pallor  Psychiatric: She has a normal mood and affect  Her behavior is normal    Nursing note and vitals reviewed  Vital Signs  ED Triage Vitals   Temperature Pulse Respirations Blood Pressure SpO2   10/10/19 2204 10/10/19 2204 10/10/19 2204 10/10/19 2204 10/10/19 2204   99 4 °F (37 4 °C) 103 18 129/81 98 %      Temp Source Heart Rate Source Patient Position - Orthostatic VS BP Location FiO2 (%)   10/10/19 2204 10/10/19 2204 -- -- --   Tympanic Monitor         Pain Score       10/10/19 2342       9           Vitals:    10/10/19 2204 10/10/19 2215   BP: 129/81 129/81   Pulse: 103          Visual Acuity      ED Medications  Medications   piperacillin-tazobactam (ZOSYN) IVPB 4 5 g (has no administration in time range)   promethazine (PHENERGAN) injection 12 5 mg (has no administration in time range)   ondansetron (ZOFRAN) injection 4 mg (4 mg Intravenous Given 10/10/19 2229)   sodium chloride 0 9 % bolus 1,000 mL (0 mL Intravenous Stopped 10/10/19 2329)   morphine injection 2 mg (2 mg Intravenous Given 10/10/19 2342)       Diagnostic Studies  Results Reviewed     Procedure Component Value Units Date/Time    Lactic acid, plasma [[id number]]  (Normal) Collected:  10/10/19 2231    Lab Status:  Final result Specimen:  Blood from Arm, Left Updated:  10/10/19 2300     LACTIC ACID 0 6 mmol/L     Narrative:       Result may be elevated if tourniquet was used during collection      Comprehensive metabolic panel [[id number]]  (Abnormal) Collected:  10/10/19 2223    Lab Status:  Final result Specimen:  Blood from Arm, Left Updated:  10/10/19 2254     Sodium 138 mmol/L      Potassium 3 6 mmol/L      Chloride 104 mmol/L      CO2 29 mmol/L      ANION GAP 5 mmol/L      BUN 7 mg/dL      Creatinine 0 70 mg/dL      Glucose 137 mg/dL      Calcium 8 8 mg/dL      AST 9 U/L      ALT <6 U/L      Alkaline Phosphatase 84 U/L      Total Protein 6 9 g/dL      Albumin 3 6 g/dL      Total Bilirubin 0 70 mg/dL      eGFR 94 ml/min/1 73sq m     Narrative:       National Kidney Disease Foundation guidelines for Chronic Kidney Disease (CKD):     Stage 1 with normal or high GFR (GFR > 90 mL/min/1 73 square meters)    Stage 2 Mild CKD (GFR = 60-89 mL/min/1 73 square meters)    Stage 3A Moderate CKD (GFR = 45-59 mL/min/1 73 square meters)    Stage 3B Moderate CKD (GFR = 30-44 mL/min/1 73 square meters)    Stage 4 Severe CKD (GFR = 15-29 mL/min/1 73 square meters)    Stage 5 End Stage CKD (GFR <15 mL/min/1 73 square meters)  Note: GFR calculation is accurate only with a steady state creatinine    Lipase [[id number]]  (Normal) Collected:  10/10/19 2223    Lab Status:  Final result Specimen:  Blood from Arm, Left Updated:  10/10/19 2253     Lipase 83 u/L     Urine Microscopic [[id number]]  (Abnormal) Collected:  10/10/19 2238    Lab Status:  Final result Specimen:  Urine, Clean Catch Updated:  10/10/19 2250     RBC, UA 1-2 /hpf      WBC, UA 10-20 /hpf      Epithelial Cells Moderate /hpf      Bacteria, UA Moderate /hpf      MUCUS THREADS Moderate    UA w Reflex to Microscopic [[id number]]  (Abnormal) Collected:  10/10/19 2238    Lab Status:  Final result Specimen:  Urine, Clean Catch Updated:  10/10/19 2244     Color, UA Yellow     Clarity, UA Clear     Specific Gravity, UA 1 015     pH, UA 6 5     Leukocytes, UA Moderate     Nitrite, UA Negative     Protein, UA Negative mg/dl      Glucose, UA Negative mg/dl      Ketones, UA Negative mg/dl      Urobilinogen, UA 0 2 E U /dl      Bilirubin, UA Interference- unable to analyze     Blood, UA Moderate    Blood culture #2 [[id number]] Collected:  10/10/19 2231    Lab Status: In process Specimen:  Blood from Arm, Left Updated:  10/10/19 2239    Blood culture #1 [[id number]] Collected:  10/10/19 2231    Lab Status:   In process Specimen:  Blood from Arm, Left Updated:  10/10/19 2239    CBC and differential [[id number]]  (Abnormal) Collected:  10/10/19 2223    Lab Status:  Final result Specimen:  Blood from Arm, Left Updated:  10/10/19 2230     WBC 8 92 Thousand/uL      RBC 4 33 Million/uL      Hemoglobin 12 9 g/dL      Hematocrit 39 0 %      MCV 90 fL      MCH 29 8 pg      MCHC 33 1 g/dL      RDW 12 9 %      MPV 13 0 fL      Platelets 243 Thousands/uL      Neutrophils Relative 66 %      Lymphocytes Relative 22 %      Monocytes Relative 10 %      Eosinophils Relative 2 %      Basophils Relative 0 %      Neutrophils Absolute 5 93 Thousands/µL      Lymphocytes Absolute 1 98 Thousands/µL      Monocytes Absolute 0 86 Thousand/µL      Eosinophils Absolute 0 13 Thousand/µL      Basophils Absolute 0 02 Thousands/µL                  CT abdomen pelvis wo contrast   Final Result by Ankita Estevez MD (10/11 0012)      Focal thickening and surrounding inflammatory change at the splenic flexure compatible with diverticulitis  No discrete fluid collection to suggest drainable abscess  No evidence of macro perforation  There is an adjacent enlarged lymph node present    since 2016 however colonoscopy is recommended to exclude underlying colon neoplasm if not already performed  The study was marked in [facility] for immediate notification  Workstation performed: [id number]                    Procedures  Procedures       ED Course                               MDM  Number of Diagnoses or Management Options  Abdominal pain:   Diverticulitis:   Diagnosis management comments: 6161- although not listed as allergy, pt told tech she is allergic to the IV contrast dye  She says she has to be pre-medicated for it  Last month when diagnosed with diverticulitis, had CT without any contrast   0020 - pt  Still having some pain after morphine and feeling more nauseous and bloated after the morphine  CT scan shows continued or recurrent diverticulitis  Will admit, IV abx, symptom control        Disposition  Final diagnoses:   Abdominal pain   Diverticulitis     Time reflects when diagnosis was documented in both MDM as applicable and the Disposition within this note     Time User Action Codes Description Comment    23/27/5639 35:18 PM Eladia Phelps A Add [O42 2] Abdominal pain     72/54/1869 78:17 AM Lawrance Ace Add [W80 19] Diverticulitis       ED Disposition     ED Disposition Condition Date/Time Comment    Admit Stable Fri Oct 11, 2019 12:28 AM Case was discussed with **hospitalist* and the patient's admission status was agreed to be Admission Status: inpatient status to the service of Dr Max Degroot*   Follow-up Information    None         Patient's Medications   Discharge Prescriptions    No medications on file     No discharge procedures on file      ED Provider  Electronically Signed by           Ita Gonzalez MD  98/79/70 5479

## 2019-10-11 NOTE — PROGRESS NOTES
10/11/19 [address] [facility]    Patient Information   Mental Status Alert   Primary Caregiver Self   Activities of Daily Living Prior to Admission   Functional Status Independent   Ambulation Independent     Per chart review and rounds with nursing and provider pt is reported to be independent with no apparent discharge needs at this time

## 2019-10-11 NOTE — CONSULTS
Consultation - [facility]  Natalie Crane 62 y o  female MRN: [id number]  Unit/Bed#: 2 [place] [address] Encounter: [id number]  Inpatient consult to gastroenterology  Consult performed by: Loly Crespo PA-C  Consult ordered by: Shankar Celaya MD          Reason for Consult / Principal Problem: diverticulitis    ASSESSMENT and PLAN:    Principal Problem:    Acute diverticulitis  Active Problems:    Hypothyroid    Asthma    UTI (urinary tract infection)    Recurrent uncomplicated diverticulitis  -recurrent episode, she completed treatment approximately 2 weeks ago, felt normal and then started developing progressive worsened symptoms over the last 5 days  -agree with zosyn   -continue IV fluids  -trial clear liquids  -she had a colonoscopy in March with diverticulosis  -outpatient follow up with her outpatient gastroenterologist  -------------------------------------------------------------------------------------------------------------------    HPI: Natalie Crane is a 63 yo female with a history of diverticulitis, hypothyroidism who presents for abdominal pain  It started 5 days ago has gotten progressively worse, worst in the LLQ and radiates the lower right side  No nausea, vomiting, last bm was yesterday  CT shows recurrent diverticulitis  She was here about 3 weeks ago with an uncomplicated episode, treated and finished antibiotics about 2 weeks ago (cefdinir flagyl), she felt well for about 10 days before her symptoms returned  She had a colonoscopy in March with Dr Mami Maddox in [facility]  She gets regular colonoscopies given a family history of colon cancer  She had 2 episodes of diverticulitis in 2016/17    REVIEW OF SYSTEMS:  CONSTITUTIONAL: Denies any fever, chills, or rigors  Good appetite, and no recent weight loss  HEENT: No earache or tinnitus  Denies hearing loss or visual disturbances  CARDIOVASCULAR: No chest pain or palpitations     RESPIRATORY: Denies any cough, hemoptysis, shortness of breath or dyspnea on exertion  GASTROINTESTINAL: As noted in the History of Present Illness  GENITOURINARY: No problems with urination  Denies any hematuria or dysuria  NEUROLOGIC: No dizziness or vertigo, denies headaches  MUSCULOSKELETAL: Denies any muscle or joint pain  SKIN: Denies skin rashes or itching  ENDOCRINE: Denies excessive thirst  Denies intolerance to heat or cold  PSYCHOSOCIAL: Denies depression or anxiety  Denies any recent memory loss       Historical Information   Past Medical History:   Diagnosis Date    Asthma     Disease of thyroid gland     Diverticulitis     Sleep apnea      Past Surgical History:   Procedure Laterality Date     SECTION      x 3    CHOLECYSTECTOMY      KNEE SURGERY Left     OOPHORECTOMY Left      Social History   Social History     Substance and Sexual Activity   Alcohol Use Yes    Alcohol/week: 2 0 standard drinks    Types: 2 Cans of beer per week    Frequency: Monthly or less    Drinks per session: 1 or 2    Binge frequency: Never     Social History     Substance and Sexual Activity   Drug Use Never     Social History     Tobacco Use   Smoking Status Former Smoker    Types: Cigarettes    Last attempt to quit:     Years since quittin 7   Smokeless Tobacco Never Used     Family History   Problem Relation Age of Onset   Khanh Doan Cancer Mother     Kidney disease Mother     Diabetes Father     Heart disease Father    Khanh Doan Cancer Sister      Meds/Allergies     Medications Prior to Admission   Medication    albuterol (2 5 mg/3 mL) 0 083 % nebulizer solution    fluticasone-salmeterol (ADVAIR DISKUS, WIXELA INHUB) 250-50 mcg/dose inhaler    levothyroxine (SYNTHROID) 112 mcg tablet    levothyroxine 125 mcg tablet    other medication, see sig,    levothyroxine 100 mcg tablet     Current Facility-Administered Medications   Medication Dose Route Frequency    albuterol inhalation solution 2 5 mg  2 5 mg Nebulization Q6H PRN    fluticasone-vilanterol (BREO ELLIPTA) 100-25 mcg/inh inhaler 1 puff  1 puff Inhalation Daily    heparin (porcine) subcutaneous injection 5,000 Units  5,000 Units Subcutaneous Q8H [address]    levothyroxine tablet 125 mcg  125 mcg Oral Early Morning    morphine injection 2 mg  2 mg Intravenous Q4H PRN    ondansetron (ZOFRAN) injection 4 mg  4 mg Intravenous Q6H PRN    piperacillin-tazobactam (ZOSYN) IVPB 3 375 g  3 375 g Intravenous Q6H    sodium chloride 0 9 % infusion  75 mL/hr Intravenous Continuous     Allergies   Allergen Reactions    Contrast [Iodinated Diagnostic Agents] Itching    Shellfish-Derived Products      Objective     Blood pressure 99/62, pulse 83, temperature 98 4 °F (36 9 °C), resp  rate 18, height 5' 2" (1 575 m), weight 71 3 kg (157 lb 3 oz), SpO2 94 %, not currently breastfeeding  Intake/Output Summary (Last 24 hours) at 10/11/2019 1026  Last data filed at 10/11/2019 9471  Gross per 24 hour   Intake --   Output 800 ml   Net -800 ml     PHYSICAL EXAM:    General Appearance:   Alert, cooperative, no distress, appears stated age    HEENT:   Normocephalic, atraumatic, anicteric   Neck:  Supple, symmetrical, trachea midline   Lungs:   Clear to auscultation bilaterally; no rales, rhonchi or wheezing; respirations unlabored    Heart[de-identified]   S1 and S2 normal; regular rate and rhythm; no murmur, rub, or gallop     Abdomen:   Soft, non-tender, non-distended; normal bowel sounds; no masses, no organomegaly    Genitalia:   Deferred    Rectal:   Deferred    Extremities:  No cyanosis, clubbing or edema    Pulses:  2+ and symmetric all extremities    Skin:  Skin color, texture, turgor normal, no rashes or lesions    Lymph nodes:  No palpable cervical lymphadenopathy      Lab Results:   Results from last 7 days   Lab Units 10/11/19  0606   WBC Thousand/uL 7 00   HEMOGLOBIN g/dL 11 4*   HEMATOCRIT % 37 0   PLATELETS Thousands/uL 146*   NEUTROS PCT % 61   LYMPHS PCT % 27   MONOS PCT % 10   EOS PCT % 2 Results from last 7 days   Lab Units 10/11/19  0606 10/10/19  2223   POTASSIUM mmol/L 3 8 3 6   CHLORIDE mmol/L 108 104   CO2 mmol/L 30 29   BUN mg/dL 6 7   CREATININE mg/dL 0 74 0 70   CALCIUM mg/dL 8 5 8 8   ALK PHOS U/L  --  84   ALT U/L  --  <6*   AST U/L  --  9         Results from last 7 days   Lab Units 10/10/19  2223   LIPASE u/L 83       Imaging Studies: I have personally reviewed pertinent imaging studies  Ct Abdomen Pelvis Wo Contrast    Result Date: 10/11/2019  Impression: Focal thickening and surrounding inflammatory change at the splenic flexure compatible with diverticulitis  No discrete fluid collection to suggest drainable abscess  No evidence of macro perforation  There is an adjacent enlarged lymph node present since 2016 however colonoscopy is recommended to exclude underlying colon neoplasm if not already performed  The study was marked in [facility] for immediate notification   Workstation performed: [id number]

## 2019-10-11 NOTE — QUICK NOTE
Patient seen and reports improvement in her abdominal pain  States that it is 3/10 in intensity  Continue with IV Zosyn for recurrent uncomplicated diverticulitis  Clear liquid diet for today  Monitor for any worsening of symptoms  Repeat lab work in a nhi

## 2019-10-11 NOTE — H&P
History and Physical - [facility] Internal Medicine    Patient Information: Jadon White 62 y o  female MRN: [id number]  Unit/Bed#: ED 07 Encounter: [id number]  Admitting Physician: Cortney Grant MD  PCP: Purnima Laguerre MD  Date of Admission:  10/11/19    Chief Complaint:     Abdominal pain  of Present Illness:    Jadon White is a 62 y o  female who presents with abdominal pain  The pt was discharged last month on po antibiotics for acute diverticulitis  She states that she completed her antibiotic regimen and her symptoms completely resolved  However approximately 5 days ago she began to have crampy lower abdominal pain which was worse in her LLQ  This pain was intermittent however very sharp in nature  She rated it 10/10 at times  It was associated with nausea but no vomiting  She also reported subjective fevers  Currently she reports improvement in her pain after receiving Morphine however it has not completely resolved  She is complaining of lower abdominal pain which is worse in her LLQ  She denies any chest pain or shortness of breath  She denies any diarrhea or blood in her stools  Review of Systems:    Review of Systems   Constitutional: Positive for fever  Respiratory: Negative for cough and shortness of breath  Cardiovascular: Negative for chest pain and leg swelling  Gastrointestinal: Positive for abdominal pain and nausea  Negative for blood in stool, diarrhea and vomiting  Genitourinary: Negative for dysuria and hematuria  Musculoskeletal: Negative for back pain  Neurological: Negative for syncope  Psychiatric/Behavioral: The patient is nervous/anxious          Past Medical and Surgical History:     Past Medical History:   Diagnosis Date    Asthma     Disease of thyroid gland     Diverticulitis     Sleep apnea        Past Surgical History:   Procedure Laterality Date     SECTION      x 3    CHOLECYSTECTOMY      KNEE SURGERY Left Meds/Allergies:    PTA meds:   Prior to Admission Medications   Prescriptions Last Dose Informant Patient Reported? Taking? albuterol (2 5 mg/3 mL) 0 083 % nebulizer solution   Yes No   Sig: Take 2 5 mg by nebulization every 6 (six) hours as needed for wheezing   fluticasone-salmeterol (ADVAIR DISKUS, WIXELA INHUB) 250-50 mcg/dose inhaler  Self Yes No   Sig: Inhale 1 puff 2 (two) times a day Rinse mouth after use  levothyroxine 125 mcg tablet  Self Yes No   Sig: Take 125 mcg by mouth daily Taken q Saturday and    other medication, see sig,  Self Yes No   Sig: Take 1 tablet by mouth daily Medication/product name: Arsen Speed  Strength:   Sig (include dose, route, frequency):1 tablet daily      Facility-Administered Medications: None       Allergies: Allergies   Allergen Reactions    Contrast [Iodinated Diagnostic Agents] Itching    Shellfish-Derived Products      History:     Marital Status: /Civil Union   Social History     Substance and Sexual Activity   Alcohol Use Yes    Alcohol/week: 2 0 standard drinks    Types: 2 Cans of beer per week    Frequency: Monthly or less    Drinks per session: 1 or 2    Binge frequency: Never     Social History     Tobacco Use   Smoking Status Former Smoker    Types: Cigarettes    Last attempt to quit: Curly Cure Years since quittin 7   Smokeless Tobacco Never Used     Social History     Substance and Sexual Activity   Drug Use Never       Family History:    History reviewed  No pertinent family history        Physical Exam:     Vitals:   Blood Pressure: 129/81 (10/10/19 2215)  Pulse: 103 (10/10/19 2204)  Temperature: 99 4 °F (37 4 °C) (10/10/19 2204)  Temp Source: Tympanic (10/10/19 2204)  Respirations: 18 (10/10/19 2204)  SpO2: 98 % (10/10/19 2204)    Physical Exam:   General: in no acute distress  HEENT: atraumatic, normocephalic  Skin: no jaundice, not diaphoretic  CVS: RRR, no murmurs appreciated  Lungs: CTAL, no wheezing or rales appreciated  Abdomen: soft, nondistended, bowel sounds normal, LLQ tenderness upon palpation, no guarding or rebound tenderness  Extremities: no edema, no calf swelling or tenderness  Neuro: alert and oriented x3  Psych: anxious, cooperative      Lab Results: I have personally reviewed pertinent reports  Results from last 7 days   Lab Units 10/10/19  2223   WBC Thousand/uL 8 92   HEMOGLOBIN g/dL 12 9   HEMATOCRIT % 39 0   PLATELETS Thousands/uL 160   NEUTROS PCT % 66   LYMPHS PCT % 22   MONOS PCT % 10   EOS PCT % 2     Results from last 7 days   Lab Units 10/10/19  2223   POTASSIUM mmol/L 3 6   CHLORIDE mmol/L 104   CO2 mmol/L 29   BUN mg/dL 7   CREATININE mg/dL 0 70   CALCIUM mg/dL 8 8   ALK PHOS U/L 84   ALT U/L <6*   AST U/L 9           Imaging: I have personally reviewed pertinent reports  Ct Abdomen Pelvis Wo Contrast    Result Date: 10/11/2019  Narrative: CT ABDOMEN AND PELVIS WITHOUT IV CONTRAST INDICATION:   Left lower quadrant abdominal pain, diverticulitis suspected  COMPARISON:  CT of abdomen pelvis on September 24, 2019  TECHNIQUE:  CT examination of the abdomen and pelvis was performed without intravenous contrast   Axial, sagittal, and coronal 2D reformatted images were created from the source data and submitted for interpretation  Radiation dose length product (DLP) for this visit:  441 86 mGy-cm   This examination, like all CT scans performed in the [facility], was performed utilizing techniques to minimize radiation dose exposure, including the use of iterative  reconstruction and automated exposure control  Enteric contrast was not administered  FINDINGS: ABDOMEN LOWER CHEST:  No clinically significant abnormality identified in the visualized lower chest  LIVER/BILIARY TREE:  Unremarkable  GALLBLADDER:  Gallbladder is surgically absent  SPLEEN:  Unremarkable  PANCREAS:  Unremarkable  ADRENAL GLANDS:  Unremarkable  KIDNEYS/URETERS:  Unremarkable  No hydronephrosis  STOMACH AND BOWEL:  Mildly thickened distal esophagus which may be due to esophagitis  No bowel obstruction  There is focal thickening and surrounding inflammatory change at the splenic flexure (series 2, image 29) compatible with diverticulitis  There is an adjacent enlarged nodes lymph node measuring 12 x 11 x 10 cm (series 2, image 27), stable in size since 2016  Additional mild stranding is seen at the proximal sigmoid colon (series 2, image 61) which may reduce present sequela of prior diverticulitis  APPENDIX:  No findings to suggest appendicitis  Hyperdense material within the appendix  ABDOMINOPELVIC CAVITY:  No ascites or free intraperitoneal air  No lymphadenopathy  VESSELS:  Unremarkable for patient's age  PELVIS REPRODUCTIVE ORGANS:  Fibroid uterus  URINARY BLADDER:  Unremarkable  ABDOMINAL WALL/INGUINAL REGIONS:  Unremarkable  OSSEOUS STRUCTURES:  No acute fracture or destructive osseous lesion  Impression: Focal thickening and surrounding inflammatory change at the splenic flexure compatible with diverticulitis  No discrete fluid collection to suggest drainable abscess  No evidence of macro perforation  There is an adjacent enlarged lymph node present since 2016 however colonoscopy is recommended to exclude underlying colon neoplasm if not already performed  The study was marked in [facility] for immediate notification  Workstation performed: [id number]     Ct Abdomen Pelvis Wo Contrast    Result Date: 9/25/2019  Narrative: CT ABDOMEN AND PELVIS WITHOUT IV CONTRAST INDICATION:   abd pain/ fever  COMPARISON:  CT abdomen and pelvis dated 11/4/2016 TECHNIQUE:  CT examination of the abdomen and pelvis was performed without intravenous contrast   Axial, sagittal, and coronal 2D reformatted images were created from the source data and submitted for interpretation  Radiation dose length product (DLP) for this visit:  491 96 mGy-cm     This examination, like all CT scans performed in the [facility], was performed utilizing techniques to minimize radiation dose exposure, including the use of iterative  reconstruction and automated exposure control  Enteric contrast was not administered  FINDINGS: ABDOMEN LOWER CHEST:  Subcentimeter calcified granuloma is incidentally seen in the right lower lobe  No clinically significant abnormality identified in the visualized lower chest  LIVER/BILIARY TREE:  3 3 cm cyst in the posterior right hepatic lobe is incidentally suspected  The liver otherwise appears grossly unremarkable  GALLBLADDER:  No calcified gallstones  No pericholecystic inflammatory change  SPLEEN:  Unremarkable  PANCREAS:  Unremarkable  ADRENAL GLANDS:  Unremarkable  KIDNEYS/URETERS:  Several tiny nonobstructing stones are seen in the left kidney  Partially duplicated left renal collecting system  Left kidney otherwise appears grossly unremarkable; no hydronephrosis  STOMACH AND BOWEL:  Colonic diverticulosis is noted, most prominent in the left colon  There is some focal mild to moderate wall thickening at the junction of the descending colon and sigmoid colon with some associated pericolonic fat stranding and a small amount of fluid which tracks into the left paracolic gutter  Findings taken together are highly suspicious for focal acute diverticulitis  No discrete pericolonic abscess is seen  APPENDIX:  No findings to suggest appendicitis  ABDOMINOPELVIC CAVITY:  No intraperitoneal free air  No bulky adenopathy  VESSELS:  Unremarkable for patient's age  No aortic aneurysm  PELVIS REPRODUCTIVE ORGANS:  Suspected exophytic posterior uterine fibroid measures approximately 5 3 cm in size  URINARY BLADDER:  Grossly unremarkable  ABDOMINAL WALL/INGUINAL REGIONS:  Unremarkable  OSSEOUS STRUCTURES:  No acute fracture or destructive osseous lesion       Impression: Colonic diverticulosis with findings highly suspicious for focal acute diverticulitis in the left lower quadrant at the junction of the descending and sigmoid colon as described (axial image 54, series 2, for example)  No discrete pericolonic abscess is seen  Left-sided nephrolithiasis without discrete hydronephrosis  Suspected uterine fibroid, right hepatic cyst and other findings as above  Workstation performed: [id number]         Assessment/Plan    * Acute diverticulitis  Assessment & Plan  CT notes the following:  Focal thickening and surrounding inflammatory change at the splenic flexure compatible with diverticulitis  No discrete fluid collection to suggest drainable abscess  No evidence of macro perforation  There is an adjacent enlarged lymph node present since 2016  Pt was recently treated for Diverticulitis and reported complete resolution however symptoms recurred several days ago  She was given Zosyn in the ED  Will resume for now, keep NPO and order Morphine and Zofran prn  Pt has an enlarged lymph node present on CT imaging since 2016 and reports undergoing a colonoscopy earlier this yr  Will require reviewing results given recurrent diverticulitis  GI consult order placed     UTI (urinary tract infection)  Assessment & Plan  Will be on Zosyn as above  Will follow up on urine culture     Asthma  Assessment & Plan  Continue home regimen     Hypothyroid  Assessment & Plan  Continue [facility] Problem List:     Principal Problem:    Acute diverticulitis  Active Problems:    Hypothyroid    Asthma    UTI (urinary tract infection)        VTE Prophylaxis: Heparin   Code Status: Prior    Anticipated Length of Stay:  Patient will be admitted on an Inpatient basis with an anticipated length of stay of atleast 2 midnights  Total Time for Visit, including Counseling / Coordination of Care: 30 minutes  Greater than 50% of this total time spent on direct patient counseling and coordination of care

## 2019-10-11 NOTE — PLAN OF CARE
Problem: PAIN - ADULT  Goal: Verbalizes/displays adequate comfort level or baseline comfort level  Description  Interventions:  - Encourage patient to monitor pain and request assistance  - Assess pain using appropriate pain scale  - Administer analgesics based on type and severity of pain and evaluate response  - Implement non-pharmacological measures as appropriate and evaluate response  - Consider cultural and social influences on pain and pain management  - Notify physician/advanced practitioner if interventions unsuccessful or patient reports new pain  Outcome: Progressing     Problem: INFECTION - ADULT  Goal: Absence or prevention of progression during hospitalization  Description  INTERVENTIONS:  - Assess and monitor for signs and symptoms of infection  - Monitor lab/diagnostic results  - Monitor all insertion sites, i e  indwelling lines, tubes, and drains  - [place] appropriate cooling/warming therapies per order  - Administer medications as ordered  - Instruct and encourage patient and family to use good hand hygiene technique  - Identify and instruct in appropriate isolation precautions for identified infection/condition   Outcome: Progressing  Goal: Absence of fever/infection during neutropenic period  Description  INTERVENTIONS:  - Monitor WBC    Outcome: Progressing     Problem: SAFETY ADULT  Goal: Patient will remain free of falls  Description  INTERVENTIONS:  - Assess patient frequently for physical needs  -  Identify cognitive and physical deficits and behaviors that affect risk of falls    -  [place] fall precautions as indicated by assessment   - Educate patient/family on patient safety including physical limitations  - Instruct patient to call for assistance with activity based on assessment  - Modify environment to reduce risk of injury  - Consider OT/PT consult to assist with strengthening/mobility  Outcome: Progressing  Goal: Maintain or return to baseline ADL function  Description  INTERVENTIONS:  -  Assess patient's ability to carry out ADLs; assess patient's baseline for ADL function and identify physical deficits which impact ability to perform ADLs (bathing, care of mouth/teeth, toileting, grooming, dressing, etc )  - Assess/evaluate cause of self-care deficits   - Assess range of motion  - Assess patient's mobility; develop plan if impaired  - Assess patient's need for assistive devices and provide as appropriate  - Encourage maximum independence but intervene and supervise when necessary  - Involve family in performance of ADLs  - Assess for home care needs following discharge   - Consider OT consult to assist with ADL evaluation and planning for discharge  - Provide patient education as appropriate  Outcome: Progressing  Goal: Maintain or return mobility status to optimal level  Description  INTERVENTIONS:  - Assess patient's baseline mobility status (ambulation, transfers, stairs, etc )    - Identify cognitive and physical deficits and behaviors that affect mobility  - Identify mobility aids required to assist with transfers and/or ambulation (gait belt, sit-to-stand, lift, walker, cane, etc )  - [place] fall precautions as indicated by assessment  - Record patient progress and toleration of activity level on Mobility SBAR; progress patient to next Phase/Stage  - Instruct patient to call for assistance with activity based on assessment  - Consider rehabilitation consult to assist with strengthening/weightbearing, etc   Outcome: Progressing     Problem: DISCHARGE PLANNING  Goal: Discharge to home or other facility with appropriate resources  Description  INTERVENTIONS:  - Identify barriers to discharge w/patient and caregiver  - Arrange for needed discharge resources and transportation as appropriate  - Identify discharge learning needs (meds, wound care, etc )  - Arrange for interpretive services to assist at discharge as needed  - Refer to Case Management Department for coordinating discharge planning if the patient needs post-hospital services based on physician/advanced practitioner order or complex needs related to functional status, cognitive ability, or social support system  Outcome: Progressing     Problem: Knowledge Deficit  Goal: Patient/family/caregiver demonstrates understanding of disease process, treatment plan, medications, and discharge instructions  Description  Complete learning assessment and assess knowledge base    Interventions:  - Provide teaching at level of understanding  - Provide teaching via preferred learning methods  Outcome: Progressing

## 2019-10-12 PROBLEM — N39.0 UTI (URINARY TRACT INFECTION): Status: RESOLVED | Noted: 2019-10-11 | Resolved: 2019-10-12

## 2019-10-12 PROBLEM — E87.6 HYPOKALEMIA: Status: ACTIVE | Noted: 2019-10-12

## 2019-10-12 LAB
ANION GAP SERPL CALCULATED.3IONS-SCNC: 6 MMOL/L (ref 4–13)
BACTERIA UR CULT: NORMAL
BUN SERPL-MCNC: 5 MG/DL (ref 5–25)
CALCIUM SERPL-MCNC: 8.1 MG/DL (ref 8.3–10.1)
CHLORIDE SERPL-SCNC: 108 MMOL/L (ref 100–108)
CO2 SERPL-SCNC: 27 MMOL/L (ref 21–32)
CREAT SERPL-MCNC: 0.66 MG/DL (ref 0.6–1.3)
ERYTHROCYTE [DISTWIDTH] IN BLOOD BY AUTOMATED COUNT: 12.1 % (ref 11.6–15.1)
GFR SERPL CREATININE-BSD FRML MDRD: 96 ML/MIN/1.73SQ M
GLUCOSE SERPL-MCNC: 93 MG/DL (ref 65–140)
HCT VFR BLD AUTO: 35.1 % (ref 34.8–46.1)
HGB BLD-MCNC: 11.1 G/DL (ref 11.5–15.4)
MCH RBC QN AUTO: 29 PG (ref 26.8–34.3)
MCHC RBC AUTO-ENTMCNC: 31.6 G/DL (ref 31.4–37.4)
MCV RBC AUTO: 92 FL (ref 82–98)
MRSA NOSE QL CULT: NORMAL
PLATELET # BLD AUTO: 125 THOUSANDS/UL (ref 149–390)
PMV BLD AUTO: 13.2 FL (ref 8.9–12.7)
POTASSIUM SERPL-SCNC: 3.4 MMOL/L (ref 3.5–5.3)
RBC # BLD AUTO: 3.83 MILLION/UL (ref 3.81–5.12)
SODIUM SERPL-SCNC: 141 MMOL/L (ref 136–145)
WBC # BLD AUTO: 5.06 THOUSAND/UL (ref 4.31–10.16)

## 2019-10-12 PROCEDURE — 99232 SBSQ HOSP IP/OBS MODERATE 35: CPT | Performed by: FAMILY MEDICINE

## 2019-10-12 PROCEDURE — 85027 COMPLETE CBC AUTOMATED: CPT | Performed by: FAMILY MEDICINE

## 2019-10-12 PROCEDURE — 80048 BASIC METABOLIC PNL TOTAL CA: CPT | Performed by: FAMILY MEDICINE

## 2019-10-12 RX ORDER — POTASSIUM CHLORIDE 20 MEQ/1
40 TABLET, EXTENDED RELEASE ORAL ONCE
Status: COMPLETED | OUTPATIENT
Start: 2019-10-12 | End: 2019-10-12

## 2019-10-12 RX ADMIN — HEPARIN SODIUM 5000 UNITS: 5000 INJECTION INTRAVENOUS; SUBCUTANEOUS at 13:41

## 2019-10-12 RX ADMIN — PIPERACILLIN SODIUM AND TAZOBACTAM SODIUM 3.38 G: 36; 4.5 INJECTION, POWDER, FOR SOLUTION INTRAVENOUS at 01:01

## 2019-10-12 RX ADMIN — PIPERACILLIN SODIUM AND TAZOBACTAM SODIUM 3.38 G: 36; 4.5 INJECTION, POWDER, FOR SOLUTION INTRAVENOUS at 13:41

## 2019-10-12 RX ADMIN — PIPERACILLIN SODIUM AND TAZOBACTAM SODIUM 3.38 G: 36; 4.5 INJECTION, POWDER, FOR SOLUTION INTRAVENOUS at 07:13

## 2019-10-12 RX ADMIN — PIPERACILLIN SODIUM AND TAZOBACTAM SODIUM 3.38 G: 36; 4.5 INJECTION, POWDER, FOR SOLUTION INTRAVENOUS at 19:21

## 2019-10-12 RX ADMIN — FLUTICASONE FUROATE AND VILANTEROL TRIFENATATE 1 PUFF: 100; 25 POWDER RESPIRATORY (INHALATION) at 09:28

## 2019-10-12 RX ADMIN — POTASSIUM CHLORIDE 40 MEQ: 1500 TABLET, EXTENDED RELEASE ORAL at 11:28

## 2019-10-12 RX ADMIN — HEPARIN SODIUM 5000 UNITS: 5000 INJECTION INTRAVENOUS; SUBCUTANEOUS at 05:44

## 2019-10-12 RX ADMIN — SODIUM CHLORIDE 75 ML/HR: 0.9 INJECTION, SOLUTION INTRAVENOUS at 06:04

## 2019-10-12 RX ADMIN — LEVOTHYROXINE SODIUM 125 MCG: 125 TABLET ORAL at 05:44

## 2019-10-12 NOTE — PLAN OF CARE
Problem: PAIN - ADULT  Goal: Verbalizes/displays adequate comfort level or baseline comfort level  Description  Interventions:  - Encourage patient to monitor pain and request assistance  - Assess pain using appropriate pain scale  - Administer analgesics based on type and severity of pain and evaluate response  - Implement non-pharmacological measures as appropriate and evaluate response  - Consider cultural and social influences on pain and pain management  - Notify physician/advanced practitioner if interventions unsuccessful or patient reports new pain  Outcome: Progressing     Problem: INFECTION - ADULT  Goal: Absence or prevention of progression during hospitalization  Description  INTERVENTIONS:  - Assess and monitor for signs and symptoms of infection  - Monitor lab/diagnostic results  - Monitor all insertion sites, i e  indwelling lines, tubes, and drains  - [place] appropriate cooling/warming therapies per order  - Administer medications as ordered  - Instruct and encourage patient and family to use good hand hygiene technique  - Identify and instruct in appropriate isolation precautions for identified infection/condition   Outcome: Progressing     Problem: SAFETY ADULT  Goal: Patient will remain free of falls  Description  INTERVENTIONS:  - Assess patient frequently for physical needs  -  Identify cognitive and physical deficits and behaviors that affect risk of falls    -  [place] fall precautions as indicated by assessment   - Educate patient/family on patient safety including physical limitations  - Instruct patient to call for assistance with activity based on assessment  - Modify environment to reduce risk of injury  - Consider OT/PT consult to assist with strengthening/mobility  Outcome: Progressing  Goal: Maintain or return to baseline ADL function  Description  INTERVENTIONS:  -  Assess patient's ability to carry out ADLs; assess patient's baseline for ADL function and identify physical deficits which impact ability to perform ADLs (bathing, care of mouth/teeth, toileting, grooming, dressing, etc )  - Assess/evaluate cause of self-care deficits   - Assess range of motion  - Assess patient's mobility; develop plan if impaired  - Assess patient's need for assistive devices and provide as appropriate  - Encourage maximum independence but intervene and supervise when necessary  - Involve family in performance of ADLs  - Assess for home care needs following discharge   - Consider OT consult to assist with ADL evaluation and planning for discharge  - Provide patient education as appropriate  Outcome: Progressing  Goal: Maintain or return mobility status to optimal level  Description  INTERVENTIONS:  - Assess patient's baseline mobility status (ambulation, transfers, stairs, etc )    - Identify cognitive and physical deficits and behaviors that affect mobility  - Identify mobility aids required to assist with transfers and/or ambulation (gait belt, sit-to-stand, lift, walker, cane, etc )  - [place] fall precautions as indicated by assessment  - Record patient progress and toleration of activity level on Mobility SBAR; progress patient to next Phase/Stage  - Instruct patient to call for assistance with activity based on assessment  - Consider rehabilitation consult to assist with strengthening/weightbearing, etc   Outcome: Progressing     Problem: DISCHARGE PLANNING  Goal: Discharge to home or other facility with appropriate resources  Description  INTERVENTIONS:  - Identify barriers to discharge w/patient and caregiver  - Arrange for needed discharge resources and transportation as appropriate  - Identify discharge learning needs (meds, wound care, etc )  - Arrange for interpretive services to assist at discharge as needed  - Refer to Case Management Department for coordinating discharge planning if the patient needs post-hospital services based on physician/advanced practitioner order or complex needs related to functional status, cognitive ability, or social support system  Outcome: Progressing     Problem: Knowledge Deficit  Goal: Patient/family/caregiver demonstrates understanding of disease process, treatment plan, medications, and discharge instructions  Description  Complete learning assessment and assess knowledge base    Interventions:  - Provide teaching at level of understanding  - Provide teaching via preferred learning methods  Outcome: Progressing

## 2019-10-12 NOTE — ASSESSMENT & PLAN NOTE
Resolved with repletion    At [facility] level within normal limits

## 2019-10-12 NOTE — PROGRESS NOTES
[facility] Progress Note  Patient: Malorie Ren 62 y o  female   MRN: [id number]  PCP: Perla Krishnan MD  Unit/Bed#: [address] [place] [address] Encounter: [id number]  Date Of Visit: 10/12/19    Problem List:    Principal Problem:    Acute diverticulitis  Active Problems:    Hypothyroid    Asthma    Hypokalemia      Assessment & Plan:    * Acute diverticulitis  Assessment & Plan  CT scan showed findings suggestive of diverticulitis at the splenic flexure  No drainable abscess or perforation was noted  Enlarged lymph node was noted which has been present since 2016  Pt was recently treated for Diverticulitis and reported complete resolution however symptoms recurred several days ago  She was given Zosyn in the ED which is being continued  Blood cultures negative  Repeat lab work in a m  Continue IV hydration  Tolerated clears and discussed with GI and advanced to full liquids  Will monitor overnight and advance diet as tolerated   Patient can follow up with own gastroenterologist after discharge for repeat colonoscopy    Hypokalemia  Assessment & Plan  Replete and get repeat lab work in a m  Check Mag level as well    Asthma  Assessment & Plan  Continue Breo, albuterol p r n  Hypothyroid  Assessment & Plan  Continue Synthroid  UTI (urinary tract infection)-resolved as of 10/12/2019  Assessment & Plan  Urine culture negative        VTE Pharmacologic Prophylaxis:   Pharmacologic: Enoxaparin (Lovenox)  Mechanical VTE Prophylaxis in Place: No    Patient Centered Rounds: I have performed bedside rounds with nursing staff today  Discussions with Specialists or Other Care Team Provider: yes    Education and Discussions with Family / Patient: yes    Time Spent for Care: 30 minutes  More than 50% of total time spent on counseling and coordination of care as described above      Current Length of Stay: 1 day(s)    Current Patient Status: Inpatient   Certification Statement: The patient will continue to require additional inpatient hospital stay due to Recurrent acute diverticulitis requiring IV antibiotics    Discharge Plan: home    Code Status: Level 1 - Full Code      Subjective:   Reports improvement in abdominal pain  states that it is 2/10 in intensity  Tolerating clears    Objective:     Vitals:   Temp (24hrs), Av 7 °F (37 1 °C), Min:98 4 °F (36 9 °C), Max:99 5 °F (37 5 °C)    Temp:  [98 4 °F (36 9 °C)-99 5 °F (37 5 °C)] 98 4 °F (36 9 °C)  HR:  [66-80] 75  Resp:  [16-19] 18  BP: ()/(51-66) 113/66  SpO2:  [93 %-95 %] 95 %  Body mass index is 28 79 kg/m²  Input and Output Summary (last 24 hours): Intake/Output Summary (Last 24 hours) at 10/12/2019 1548  Last data filed at 10/12/2019 1348  Gross per 24 hour   Intake 1070 ml   Output 2500 ml   Net -1430 ml       Physical Exam:     Physical Exam   Constitutional: She is oriented to person, place, and time  She appears well-developed and well-nourished  No distress  HENT:   Head: Normocephalic and atraumatic  Mouth/Throat: Oropharynx is clear and moist    Eyes: EOM are normal  Right eye exhibits no discharge  Left eye exhibits no discharge  No scleral icterus  Neck: Neck supple  Cardiovascular: Normal rate and regular rhythm  Pulmonary/Chest: Effort normal and breath sounds normal  No respiratory distress  She has no wheezes  She has no rales  Abdominal: Soft  Bowel sounds are normal  She exhibits no distension  There is tenderness (LLQ, suprapubic)  There is no guarding  Musculoskeletal: She exhibits no edema  Neurological: She is alert and oriented to person, place, and time  No cranial nerve deficit  Skin: She is not diaphoretic  Psychiatric: She has a normal mood and affect         Additional Data:     Labs:    Results from last 7 days   Lab Units 10/12/19  0535 10/11/19  0606   WBC Thousand/uL 5 06 7 00   HEMOGLOBIN g/dL 11 1* 11 4*   HEMATOCRIT % 35 1 37 0   PLATELETS Thousands/uL 125* 146*   NEUTROS PCT %  --  61 LYMPHS PCT %  --  27   MONOS PCT %  --  10   EOS PCT %  --  2     Results from last 7 days   Lab Units 10/12/19  0535  10/10/19  2223   POTASSIUM mmol/L 3 4*   < > 3 6   CHLORIDE mmol/L 108   < > 104   CO2 mmol/L 27   < > 29   BUN mg/dL 5   < > 7   CREATININE mg/dL 0 66   < > 0 70   CALCIUM mg/dL 8 1*   < > 8 8   ALK PHOS U/L  --   --  84   ALT U/L  --   --  <6*   AST U/L  --   --  9    < > = values in this interval not displayed  * I Have Reviewed All Lab Data Listed Above  * Additional Pertinent Lab Tests Reviewed: [facility] Admission Reviewed      Imaging:  Imaging Reports Reviewed Today Include: CT abd/pelvis  Imaging Personally Reviewed by Myself Includes:  N/A    Recent Cultures (last 7 days):     Results from last 7 days   Lab Units 10/11/19  0627 10/10/19  2231   BLOOD CULTURE   --  No Growth at 24 hrs  No Growth at 24 hrs  URINE CULTURE  <10,000 cfu/ml   --        Last 24 Hours Medication List:     Current Facility-Administered Medications:  albuterol 2 5 mg Nebulization Q6H PRN Baylee Alcantara MD    enoxaparin 40 mg Subcutaneous Q24H [facility] Rosenda Taylor DO    fluticasone-vilanterol 1 puff Inhalation Daily Baylee Alcantara MD    levothyroxine 125 mcg Oral Early Morning Baylee Alcantara MD    morphine injection 2 mg Intravenous Q4H PRN Baylee Alcantara MD    ondansetron 4 mg Intravenous Q6H PRN Baylee Alcantara MD    piperacillin-tazobactam 3 375 g Intravenous Q6H Baylee Alcantara MD Last Rate: 0 g (10/11/19 1440)   sodium chloride 75 mL/hr Intravenous Continuous Baylee Alcantara MD Last Rate: 75 mL/hr (10/12/19 0604)          Today, Patient Was Seen By: Emir Celaya DO    ** Please Note: "This note has been constructed using a voice recognition system  Therefore there may be syntax, spelling, and/or grammatical errors   Please call if you have any questions  "**

## 2019-10-13 LAB
ANION GAP SERPL CALCULATED.3IONS-SCNC: 10 MMOL/L (ref 4–13)
BUN SERPL-MCNC: 4 MG/DL (ref 5–25)
CALCIUM SERPL-MCNC: 8.7 MG/DL (ref 8.3–10.1)
CHLORIDE SERPL-SCNC: 107 MMOL/L (ref 100–108)
CO2 SERPL-SCNC: 25 MMOL/L (ref 21–32)
CREAT SERPL-MCNC: 0.68 MG/DL (ref 0.6–1.3)
ERYTHROCYTE [DISTWIDTH] IN BLOOD BY AUTOMATED COUNT: 12 % (ref 11.6–15.1)
GFR SERPL CREATININE-BSD FRML MDRD: 95 ML/MIN/1.73SQ M
GLUCOSE SERPL-MCNC: 87 MG/DL (ref 65–140)
HCT VFR BLD AUTO: 34.9 % (ref 34.8–46.1)
HGB BLD-MCNC: 11.2 G/DL (ref 11.5–15.4)
MAGNESIUM SERPL-MCNC: 2.1 MG/DL (ref 1.6–2.6)
MCH RBC QN AUTO: 28.9 PG (ref 26.8–34.3)
MCHC RBC AUTO-ENTMCNC: 32.1 G/DL (ref 31.4–37.4)
MCV RBC AUTO: 90 FL (ref 82–98)
PLATELET # BLD AUTO: 139 THOUSANDS/UL (ref 149–390)
PMV BLD AUTO: 12.9 FL (ref 8.9–12.7)
POTASSIUM SERPL-SCNC: 3.4 MMOL/L (ref 3.5–5.3)
RBC # BLD AUTO: 3.88 MILLION/UL (ref 3.81–5.12)
SODIUM SERPL-SCNC: 142 MMOL/L (ref 136–145)
WBC # BLD AUTO: 4.48 THOUSAND/UL (ref 4.31–10.16)

## 2019-10-13 PROCEDURE — 99232 SBSQ HOSP IP/OBS MODERATE 35: CPT | Performed by: FAMILY MEDICINE

## 2019-10-13 PROCEDURE — 85027 COMPLETE CBC AUTOMATED: CPT | Performed by: FAMILY MEDICINE

## 2019-10-13 PROCEDURE — 80048 BASIC METABOLIC PNL TOTAL CA: CPT | Performed by: FAMILY MEDICINE

## 2019-10-13 PROCEDURE — 83735 ASSAY OF MAGNESIUM: CPT | Performed by: FAMILY MEDICINE

## 2019-10-13 RX ORDER — POTASSIUM CHLORIDE 14.9 MG/ML
20 INJECTION INTRAVENOUS ONCE
Status: COMPLETED | OUTPATIENT
Start: 2019-10-13 | End: 2019-10-13

## 2019-10-13 RX ORDER — SACCHAROMYCES BOULARDII 250 MG
250 CAPSULE ORAL 2 TIMES DAILY
Status: DISCONTINUED | OUTPATIENT
Start: 2019-10-13 | End: 2019-10-14 | Stop reason: HOSPADM

## 2019-10-13 RX ORDER — POTASSIUM CHLORIDE 29.8 MG/ML
40 INJECTION INTRAVENOUS ONCE
Status: DISCONTINUED | OUTPATIENT
Start: 2019-10-13 | End: 2019-10-13 | Stop reason: CLARIF

## 2019-10-13 RX ADMIN — PIPERACILLIN SODIUM AND TAZOBACTAM SODIUM 3.38 G: 36; 4.5 INJECTION, POWDER, FOR SOLUTION INTRAVENOUS at 19:30

## 2019-10-13 RX ADMIN — Medication 250 MG: at 17:09

## 2019-10-13 RX ADMIN — POTASSIUM CHLORIDE 20 MEQ: 14.9 INJECTION, SOLUTION INTRAVENOUS at 13:07

## 2019-10-13 RX ADMIN — FLUTICASONE FUROATE AND VILANTEROL TRIFENATATE 1 PUFF: 100; 25 POWDER RESPIRATORY (INHALATION) at 09:28

## 2019-10-13 RX ADMIN — POTASSIUM CHLORIDE 20 MEQ: 14.9 INJECTION, SOLUTION INTRAVENOUS at 09:31

## 2019-10-13 RX ADMIN — PIPERACILLIN SODIUM AND TAZOBACTAM SODIUM 3.38 G: 36; 4.5 INJECTION, POWDER, FOR SOLUTION INTRAVENOUS at 13:45

## 2019-10-13 RX ADMIN — LEVOTHYROXINE SODIUM 125 MCG: 125 TABLET ORAL at 05:39

## 2019-10-13 RX ADMIN — PIPERACILLIN SODIUM AND TAZOBACTAM SODIUM 3.38 G: 36; 4.5 INJECTION, POWDER, FOR SOLUTION INTRAVENOUS at 00:51

## 2019-10-13 RX ADMIN — SODIUM CHLORIDE 75 ML/HR: 0.9 INJECTION, SOLUTION INTRAVENOUS at 13:09

## 2019-10-13 RX ADMIN — Medication 250 MG: at 13:50

## 2019-10-13 RX ADMIN — PIPERACILLIN SODIUM AND TAZOBACTAM SODIUM 3.38 G: 36; 4.5 INJECTION, POWDER, FOR SOLUTION INTRAVENOUS at 06:15

## 2019-10-13 NOTE — PROGRESS NOTES
[facility] Progress Note  Patient: Jun Yan 62 y o  female   MRN: [id number]  PCP: Sully Tan MD  Unit/Bed#: [address] [place] [address] Encounter: [id number]  Date Of Visit: 10/13/19    Problem List:    Principal Problem:    Acute diverticulitis  Active Problems:    Hypothyroid    Asthma    Hypokalemia      Assessment & Plan:    * Acute diverticulitis  Assessment & Plan  CT scan showed findings suggestive of diverticulitis at the splenic flexure  No drainable abscess or perforation was noted  Enlarged lymph node was noted which has been present since 2016  Pt was recently treated for Diverticulitis and reported complete resolution however symptoms recurred several days ago  She was given Zosyn in the ED which is being continued  Blood cultures negative  Patient afebrile with no leukocytosis  Repeat lab work in a m  Continue IV hydration  Tolerated clears and was advanced to full liquids which she is tolerating  Advance to low-fiber/low residue diet  Will monitor overnight and advance diet as tolerated   Patient can follow up with own gastroenterologist after discharge for repeat colonoscopy    Hypokalemia  Assessment & Plan  Replete and get repeat lab work in a m  Mag level within normal limits    Asthma  Assessment & Plan  Continue Breo, albuterol p r n    Hypothyroid  Assessment & Plan  Continue Synthroid    UTI (urinary tract infection)-resolved as of 10/12/2019  Assessment & Plan  Urine culture negative        VTE Pharmacologic Prophylaxis:   Pharmacologic: Enoxaparin (Lovenox)  Mechanical VTE Prophylaxis in Place: No    Patient Centered Rounds: I have performed bedside rounds with nursing staff today  Discussions with Specialists or Other Care Team Provider: yes    Education and Discussions with Family / Patient: yes    Time Spent for Care: 30 minutes  More than 50% of total time spent on counseling and coordination of care as described above      Current Length of Stay: 2 day(s)    Current Patient Status: Inpatient   Certification Statement: The patient will continue to require additional inpatient hospital stay due to Recurrent acute diverticulitis requiring IV antibiotics    Discharge Plan: home    Code Status: Level 1 - Full Code      Subjective: Tolerating full liquid diet  States that abdominal pain is 3/10 in intensity at its worst    Objective:     Vitals:   Temp (24hrs), Av 4 °F (36 9 °C), Min:98 2 °F (36 8 °C), Max:98 6 °F (37 °C)    Temp:  [98 2 °F (36 8 °C)-98 6 °F (37 °C)] 98 2 °F (36 8 °C)  HR:  [69-77] 69  Resp:  [17-18] 17  BP: (105-113)/(64-70) 105/64  SpO2:  [95 %-96 %] 96 %  Body mass index is 28 51 kg/m²  Input and Output Summary (last 24 hours): Intake/Output Summary (Last 24 hours) at 10/13/2019 1220  Last data filed at 10/13/2019 1211  Gross per 24 hour   Intake 840 ml   Output 2750 ml   Net -1910 ml       Physical Exam:     Physical Exam   Constitutional: She is oriented to person, place, and time  She appears well-developed and well-nourished  No distress  HENT:   Head: Normocephalic and atraumatic  Eyes: EOM are normal  Right eye exhibits no discharge  Left eye exhibits no discharge  No scleral icterus  Neck: Neck supple  Cardiovascular: Normal rate and regular rhythm  Pulmonary/Chest: Effort normal and breath sounds normal  No respiratory distress  She has no wheezes  She has no rales  Abdominal: Soft  Bowel sounds are normal  She exhibits no distension  There is tenderness (mild suprapubic)  There is no guarding  Musculoskeletal: She exhibits no edema  Neurological: She is alert and oriented to person, place, and time  No cranial nerve deficit  Skin: She is not diaphoretic         Additional Data:     Labs:    Results from last 7 days   Lab Units 10/13/19  0501  10/11/19  0606   WBC Thousand/uL 4 48   < > 7 00   HEMOGLOBIN g/dL 11 2*   < > 11 4*   HEMATOCRIT % 34 9   < > 37 0   PLATELETS Thousands/uL 139*   < > 146* NEUTROS PCT %  --   --  61   LYMPHS PCT %  --   --  27   MONOS PCT %  --   --  10   EOS PCT %  --   --  2    < > = values in this interval not displayed  Results from last 7 days   Lab Units 10/13/19  0501  10/10/19  2223   POTASSIUM mmol/L 3 4*   < > 3 6   CHLORIDE mmol/L 107   < > 104   CO2 mmol/L 25   < > 29   BUN mg/dL 4*   < > 7   CREATININE mg/dL 0 68   < > 0 70   CALCIUM mg/dL 8 7   < > 8 8   ALK PHOS U/L  --   --  84   ALT U/L  --   --  <6*   AST U/L  --   --  9    < > = values in this interval not displayed  * I Have Reviewed All Lab Data Listed Above  * Additional Pertinent Lab Tests Reviewed: [facility] Admission Reviewed      Imaging:  Imaging Reports Reviewed Today Include: CT abd/pelvis  Imaging Personally Reviewed by Myself Includes:  N/A    Recent Cultures (last 7 days):     Results from last 7 days   Lab Units 10/11/19  0627 10/10/19  2231   BLOOD CULTURE   --  No Growth at 48 hrs  No Growth at 48 hrs     URINE CULTURE  <10,000 cfu/ml   --        Last 24 Hours Medication List:     Current Facility-Administered Medications:  albuterol 2 5 mg Nebulization Q6H PRN Irvin Fong MD    enoxaparin 40 mg Subcutaneous Q24H [facility] Rosenda Taylor DO    fluticasone-vilanterol 1 puff Inhalation Daily Irvin Fong MD    levothyroxine 125 mcg Oral Early Morning Cathern MD Ajit    morphine injection 2 mg Intravenous Q4H PRN Catherneva Fong MD    ondansetron 4 mg Intravenous Q6H PRN Cathern MD Ajit    piperacillin-tazobactam 3 375 g Intravenous Q6H Catherneva Fong MD Last Rate: 0 g (10/11/19 1440)   potassium chloride 20 mEq Intravenous Once Rosenda Taylor DO    saccharomyces boulardii 250 mg Oral BID Rosenda Taylor,     sodium chloride 75 mL/hr Intravenous Continuous Cathern MD Ajit Last Rate: 75 mL/hr (10/12/19 0604)          Today, Patient Was Seen By: Eleni Fitzgerald DO    ** Please Note: "This note has been constructed using a voice recognition system  Therefore there may be syntax, spelling, and/or grammatical errors   Please call if you have any questions  "**

## 2019-10-13 NOTE — PLAN OF CARE
Problem: PAIN - ADULT  Goal: Verbalizes/displays adequate comfort level or baseline comfort level  Description  Interventions:  - Encourage patient to monitor pain and request assistance  - Assess pain using appropriate pain scale  - Administer analgesics based on type and severity of pain and evaluate response  - Implement non-pharmacological measures as appropriate and evaluate response  - Consider cultural and social influences on pain and pain management  - Notify physician/advanced practitioner if interventions unsuccessful or patient reports new pain  Outcome: Progressing     Problem: INFECTION - ADULT  Goal: Absence or prevention of progression during hospitalization  Description  INTERVENTIONS:  - Assess and monitor for signs and symptoms of infection  - Monitor lab/diagnostic results  - Monitor all insertion sites, i e  indwelling lines, tubes, and drains  - [place] appropriate cooling/warming therapies per order  - Administer medications as ordered  - Instruct and encourage patient and family to use good hand hygiene technique  - Identify and instruct in appropriate isolation precautions for identified infection/condition   Outcome: Progressing     Problem: SAFETY ADULT  Goal: Patient will remain free of falls  Description  INTERVENTIONS:  - Assess patient frequently for physical needs  -  Identify cognitive and physical deficits and behaviors that affect risk of falls    -  [place] fall precautions as indicated by assessment   - Educate patient/family on patient safety including physical limitations  - Instruct patient to call for assistance with activity based on assessment  - Modify environment to reduce risk of injury  - Consider OT/PT consult to assist with strengthening/mobility  Outcome: Progressing  Goal: Maintain or return to baseline ADL function  Description  INTERVENTIONS:  -  Assess patient's ability to carry out ADLs; assess patient's baseline for ADL function and identify physical deficits which impact ability to perform ADLs (bathing, care of mouth/teeth, toileting, grooming, dressing, etc )  - Assess/evaluate cause of self-care deficits   - Assess range of motion  - Assess patient's mobility; develop plan if impaired  - Assess patient's need for assistive devices and provide as appropriate  - Encourage maximum independence but intervene and supervise when necessary  - Involve family in performance of ADLs  - Assess for home care needs following discharge   - Consider OT consult to assist with ADL evaluation and planning for discharge  - Provide patient education as appropriate  Outcome: Progressing  Goal: Maintain or return mobility status to optimal level  Description  INTERVENTIONS:  - Assess patient's baseline mobility status (ambulation, transfers, stairs, etc )    - Identify cognitive and physical deficits and behaviors that affect mobility  - Identify mobility aids required to assist with transfers and/or ambulation (gait belt, sit-to-stand, lift, walker, cane, etc )  - [place] fall precautions as indicated by assessment  - Record patient progress and toleration of activity level on Mobility SBAR; progress patient to next Phase/Stage  - Instruct patient to call for assistance with activity based on assessment  - Consider rehabilitation consult to assist with strengthening/weightbearing, etc   Outcome: Progressing     Problem: DISCHARGE PLANNING  Goal: Discharge to home or other facility with appropriate resources  Description  INTERVENTIONS:  - Identify barriers to discharge w/patient and caregiver  - Arrange for needed discharge resources and transportation as appropriate  - Identify discharge learning needs (meds, wound care, etc )  - Arrange for interpretive services to assist at discharge as needed  - Refer to Case Management Department for coordinating discharge planning if the patient needs post-hospital services based on physician/advanced practitioner order or complex needs related to functional status, cognitive ability, or social support system  Outcome: Progressing     Problem: Knowledge Deficit  Goal: Patient/family/caregiver demonstrates understanding of disease process, treatment plan, medications, and discharge instructions  Description  Complete learning assessment and assess knowledge base    Interventions:  - Provide teaching at level of understanding  - Provide teaching via preferred learning methods  Outcome: Progressing

## 2019-10-14 VITALS
BODY MASS INDEX: 28.68 KG/M2 | WEIGHT: 155.87 LBS | TEMPERATURE: 98.3 F | SYSTOLIC BLOOD PRESSURE: 110 MMHG | OXYGEN SATURATION: 96 % | HEART RATE: 74 BPM | RESPIRATION RATE: 16 BRPM | DIASTOLIC BLOOD PRESSURE: 69 MMHG | HEIGHT: 62 IN

## 2019-10-14 PROBLEM — E87.6 HYPOKALEMIA: Status: RESOLVED | Noted: 2019-10-12 | Resolved: 2019-10-14

## 2019-10-14 LAB
ANION GAP SERPL CALCULATED.3IONS-SCNC: 11 MMOL/L (ref 4–13)
BUN SERPL-MCNC: 6 MG/DL (ref 5–25)
CALCIUM SERPL-MCNC: 9.2 MG/DL (ref 8.3–10.1)
CHLORIDE SERPL-SCNC: 107 MMOL/L (ref 100–108)
CO2 SERPL-SCNC: 25 MMOL/L (ref 21–32)
CREAT SERPL-MCNC: 0.63 MG/DL (ref 0.6–1.3)
ERYTHROCYTE [DISTWIDTH] IN BLOOD BY AUTOMATED COUNT: 12 % (ref 11.6–15.1)
GFR SERPL CREATININE-BSD FRML MDRD: 97 ML/MIN/1.73SQ M
GLUCOSE SERPL-MCNC: 92 MG/DL (ref 65–140)
HCT VFR BLD AUTO: 40.3 % (ref 34.8–46.1)
HGB BLD-MCNC: 12.7 G/DL (ref 11.5–15.4)
MCH RBC QN AUTO: 28.2 PG (ref 26.8–34.3)
MCHC RBC AUTO-ENTMCNC: 31.5 G/DL (ref 31.4–37.4)
MCV RBC AUTO: 90 FL (ref 82–98)
PLATELET # BLD AUTO: 167 THOUSANDS/UL (ref 149–390)
PMV BLD AUTO: 12.4 FL (ref 8.9–12.7)
POTASSIUM SERPL-SCNC: 3.9 MMOL/L (ref 3.5–5.3)
RBC # BLD AUTO: 4.5 MILLION/UL (ref 3.81–5.12)
SODIUM SERPL-SCNC: 143 MMOL/L (ref 136–145)
WBC # BLD AUTO: 4.79 THOUSAND/UL (ref 4.31–10.16)

## 2019-10-14 PROCEDURE — 80048 BASIC METABOLIC PNL TOTAL CA: CPT | Performed by: FAMILY MEDICINE

## 2019-10-14 PROCEDURE — 99239 HOSP IP/OBS DSCHRG MGMT >30: CPT | Performed by: FAMILY MEDICINE

## 2019-10-14 PROCEDURE — 85027 COMPLETE CBC AUTOMATED: CPT | Performed by: FAMILY MEDICINE

## 2019-10-14 RX ORDER — SACCHAROMYCES BOULARDII 250 MG
250 CAPSULE ORAL 2 TIMES DAILY
Qty: 40 CAPSULE | Refills: 0 | Status: SHIPPED | OUTPATIENT
Start: 2019-10-14

## 2019-10-14 RX ORDER — AMOXICILLIN AND CLAVULANATE POTASSIUM 875; 125 MG/1; MG/1
1 TABLET, FILM COATED ORAL EVERY 8 HOURS
Qty: 21 TABLET | Refills: 0 | Status: SHIPPED | OUTPATIENT
Start: 2019-10-14 | End: 2019-10-21

## 2019-10-14 RX ADMIN — Medication 250 MG: at 08:35

## 2019-10-14 RX ADMIN — PIPERACILLIN SODIUM AND TAZOBACTAM SODIUM 3.38 G: 36; 4.5 INJECTION, POWDER, FOR SOLUTION INTRAVENOUS at 12:43

## 2019-10-14 RX ADMIN — FLUTICASONE FUROATE AND VILANTEROL TRIFENATATE 1 PUFF: 100; 25 POWDER RESPIRATORY (INHALATION) at 08:34

## 2019-10-14 RX ADMIN — PIPERACILLIN SODIUM AND TAZOBACTAM SODIUM 3.38 G: 36; 4.5 INJECTION, POWDER, FOR SOLUTION INTRAVENOUS at 02:00

## 2019-10-14 RX ADMIN — PIPERACILLIN SODIUM AND TAZOBACTAM SODIUM 3.38 G: 36; 4.5 INJECTION, POWDER, FOR SOLUTION INTRAVENOUS at 06:09

## 2019-10-14 RX ADMIN — LEVOTHYROXINE SODIUM 125 MCG: 125 TABLET ORAL at 06:09

## 2019-10-14 RX ADMIN — SODIUM CHLORIDE 75 ML/HR: 0.9 INJECTION, SOLUTION INTRAVENOUS at 06:12

## 2019-10-14 RX ADMIN — ENOXAPARIN SODIUM 40 MG: 40 INJECTION SUBCUTANEOUS at 08:34

## 2019-10-14 NOTE — NURSING NOTE
Pt discharged with mely DANIELSON summary reviewed  Note given for work and prescriptions   Educated on diet

## 2019-10-14 NOTE — PLAN OF CARE
Problem: RESPIRATORY - ADULT  Goal: Achieves optimal ventilation and oxygenation  Description  INTERVENTIONS:  - Assess for changes in respiratory status  - Assess for changes in mentation and behavior  - Position to facilitate oxygenation and minimize respiratory effort  - Oxygen administered by appropriate delivery if ordered  - Initiate smoking cessation education as indicated  - Encourage broncho-pulmonary hygiene including cough, deep breathe, Incentive Spirometry  - Assess the need for suctioning and aspirate as needed  - Assess and instruct to report SOB or any respiratory difficulty  - Respiratory Therapy support as indicated  Outcome: Progressing

## 2019-10-14 NOTE — PLAN OF CARE
Problem: PAIN - ADULT  Goal: Verbalizes/displays adequate comfort level or baseline comfort level  Description  Interventions:  - Encourage patient to monitor pain and request assistance  - Assess pain using appropriate pain scale  - Administer analgesics based on type and severity of pain and evaluate response  - Implement non-pharmacological measures as appropriate and evaluate response  - Consider cultural and social influences on pain and pain management  - Notify physician/advanced practitioner if interventions unsuccessful or patient reports new pain  10/14/2019 1711 by Tayler Jerry RN  Outcome: Completed  10/14/2019 0928 by Tayler Jerry RN  Outcome: Progressing     Problem: INFECTION - ADULT  Goal: Absence or prevention of progression during hospitalization  Description  INTERVENTIONS:  - Assess and monitor for signs and symptoms of infection  - Monitor lab/diagnostic results  - Monitor all insertion sites, i e  indwelling lines, tubes, and drains  - [place] appropriate cooling/warming therapies per order  - Administer medications as ordered  - Instruct and encourage patient and family to use good hand hygiene technique  - Identify and instruct in appropriate isolation precautions for identified infection/condition   10/14/2019 1711 by Tayler Jerry RN  Outcome: Completed  10/14/2019 0928 by Tayler Jerry RN  Outcome: Progressing     Problem: SAFETY ADULT  Goal: Patient will remain free of falls  Description  INTERVENTIONS:  - Assess patient frequently for physical needs  -  Identify cognitive and physical deficits and behaviors that affect risk of falls    -  [place] fall precautions as indicated by assessment   - Educate patient/family on patient safety including physical limitations  - Instruct patient to call for assistance with activity based on assessment  - Modify environment to reduce risk of injury  - Consider OT/PT consult to assist with strengthening/mobility  10/14/2019 1711 by Romelia Head RN  Outcome: Completed  10/14/2019 0928 by Romelia Head RN  Outcome: Progressing  Goal: Maintain or return to baseline ADL function  Description  INTERVENTIONS:  -  Assess patient's ability to carry out ADLs; assess patient's baseline for ADL function and identify physical deficits which impact ability to perform ADLs (bathing, care of mouth/teeth, toileting, grooming, dressing, etc )  - Assess/evaluate cause of self-care deficits   - Assess range of motion  - Assess patient's mobility; develop plan if impaired  - Assess patient's need for assistive devices and provide as appropriate  - Encourage maximum independence but intervene and supervise when necessary  - Involve family in performance of ADLs  - Assess for home care needs following discharge   - Consider OT consult to assist with ADL evaluation and planning for discharge  - Provide patient education as appropriate  10/14/2019 1711 by Romelia Head RN  Outcome: Completed  10/14/2019 0928 by Romelia Head RN  Outcome: Progressing  Goal: Maintain or return mobility status to optimal level  Description  INTERVENTIONS:  - Assess patient's baseline mobility status (ambulation, transfers, stairs, etc )    - Identify cognitive and physical deficits and behaviors that affect mobility  - Identify mobility aids required to assist with transfers and/or ambulation (gait belt, sit-to-stand, lift, walker, cane, etc )  - [place] fall precautions as indicated by assessment  - Record patient progress and toleration of activity level on Mobility SBAR; progress patient to next Phase/Stage  - Instruct patient to call for assistance with activity based on assessment  - Consider rehabilitation consult to assist with strengthening/weightbearing, etc   10/14/2019 1711 by Romelia Head RN  Outcome: Completed  10/14/2019 0928 by Romelia Head RN  Outcome: Progressing     Problem: Knowledge Deficit  Goal: Patient/family/caregiver demonstrates understanding of disease process, treatment plan, medications, and discharge instructions  Description  Complete learning assessment and assess knowledge base    Interventions:  - Provide teaching at level of understanding  - Provide teaching via preferred learning methods  10/14/2019 1711 by Kathleen Wray RN  Outcome: Completed  10/14/2019 0928 by Kathleen Wray RN  Outcome: Progressing     Problem: RESPIRATORY - ADULT  Goal: Achieves optimal ventilation and oxygenation  Description  INTERVENTIONS:  - Assess for changes in respiratory status  - Assess for changes in mentation and behavior  - Position to facilitate oxygenation and minimize respiratory effort  - Oxygen administered by appropriate delivery if ordered  - Initiate smoking cessation education as indicated  - Encourage broncho-pulmonary hygiene including cough, deep breathe, Incentive Spirometry  - Assess the need for suctioning and aspirate as needed  - Assess and instruct to report SOB or any respiratory difficulty  - Respiratory Therapy support as indicated  10/14/2019 1711 by Kathleen Wray RN  Outcome: Completed  10/14/2019 0928 by Kathleen Wray RN  Outcome: Progressing

## 2019-10-14 NOTE — PLAN OF CARE
Problem: INFECTION - ADULT  Goal: Absence or prevention of progression during hospitalization  Description  INTERVENTIONS:  - Assess and monitor for signs and symptoms of infection  - Monitor lab/diagnostic results  - Monitor all insertion sites, i e  indwelling lines, tubes, and drains  - [place] appropriate cooling/warming therapies per order  - Administer medications as ordered  - Instruct and encourage patient and family to use good hand hygiene technique  - Identify and instruct in appropriate isolation precautions for identified infection/condition   Outcome: Progressing   IV abx  Problem: DISCHARGE PLANNING  Goal: Discharge to home or other facility with appropriate resources  Description  INTERVENTIONS:  - Identify barriers to discharge w/patient and caregiver  - Arrange for needed discharge resources and transportation as appropriate  - Identify discharge learning needs (meds, wound care, etc )  - Arrange for interpretive services to assist at discharge as needed  - Refer to Case Management Department for coordinating discharge planning if the patient needs post-hospital services based on physician/advanced practitioner order or complex needs related to functional status, cognitive ability, or social support system  Outcome: Progressing

## 2019-10-14 NOTE — DISCHARGE SUMMARY
Discharge Summary - [address] [facility]    Patient Information: Chandu Brown 62 y o  female MRN: [id number]  Unit/Bed#: [address] [address] Encounter: [id number]    Discharging Physician / Practitioner: Gilberto Thurston DO  PCP: Rica Chavez MD  Admission Date: 10/10/2019  Discharge Date: 10/14/19    Reason for Admission: Abdominal Pain (pt presents to the ed with abdominal pain for several days  pt states that it feels like her diverticulitis acting up )      Discharge Diagnoses:     Principal Problem:    Acute diverticulitis  Active Problems:    Hypothyroid    Asthma  Resolved Problems:    UTI (urinary tract infection)    Hypokalemia        * Acute diverticulitis  Assessment & Plan  CT scan showed findings suggestive of diverticulitis at the splenic flexure  No drainable abscess or perforation was noted  Enlarged lymph node was noted which has been present since 2016  Pt was recently treated for Diverticulitis and reported complete resolution however symptoms recurred several days ago  She was given Zosyn here and transitioned to Augmentin on discharge  Blood cultures negative  Patient afebrile with no leukocytosis  She was on IV hydration  Tolerated low-fiber/low residue diet by day of discharge  Patient can follow up with own gastroenterologist after discharge for repeat colonoscopy    Asthma  Assessment & Plan  Continue Advair, albuterol p r n as she uses at home    Hypothyroid  Assessment & Plan  Continue Synthroid  Hypokalemia-resolved as of 10/14/2019  Assessment & Plan  Resolved with repletion    [facility]    UTI (urinary tract infection)-resolved as of 10/12/2019  Assessment & Plan  Urine culture negative      Consultations During Hospital Stay:  IP CONSULT TO GASTROENTEROLOGY    Procedures Performed:     · none    Significant Findings:     · Refer to hospital course and above listed diagnosis related plan for details    Imaging while in hospital:    Ct Abdomen Pelvis Wo Contrast    Result Date: 10/11/2019  Narrative: CT ABDOMEN AND PELVIS WITHOUT IV CONTRAST INDICATION:   Left lower quadrant abdominal pain, diverticulitis suspected  COMPARISON:  CT of abdomen pelvis on September 24, 2019  TECHNIQUE:  CT examination of the abdomen and pelvis was performed without intravenous contrast   Axial, sagittal, and coronal 2D reformatted images were created from the source data and submitted for interpretation  Radiation dose length product (DLP) for this visit:  441 86 mGy-cm   This examination, like all CT scans performed in the [facility], was performed utilizing techniques to minimize radiation dose exposure, including the use of iterative  reconstruction and automated exposure control  Enteric contrast was not administered  FINDINGS: ABDOMEN LOWER CHEST:  No clinically significant abnormality identified in the visualized lower chest  LIVER/BILIARY TREE:  Unremarkable  GALLBLADDER:  Gallbladder is surgically absent  SPLEEN:  Unremarkable  PANCREAS:  Unremarkable  ADRENAL GLANDS:  Unremarkable  KIDNEYS/URETERS:  Unremarkable  No hydronephrosis  STOMACH AND BOWEL:  Mildly thickened distal esophagus which may be due to esophagitis  No bowel obstruction  There is focal thickening and surrounding inflammatory change at the splenic flexure (series 2, image 29) compatible with diverticulitis  There is an adjacent enlarged nodes lymph node measuring 12 x 11 x 10 cm (series 2, image 27), stable in size since 2016  Additional mild stranding is seen at the proximal sigmoid colon (series 2, image 61) which may reduce present sequela of prior diverticulitis  APPENDIX:  No findings to suggest appendicitis  Hyperdense material within the appendix  ABDOMINOPELVIC CAVITY:  No ascites or free intraperitoneal air  No lymphadenopathy  VESSELS:  Unremarkable for patient's age  PELVIS REPRODUCTIVE ORGANS:  Fibroid uterus  URINARY BLADDER:  Unremarkable   ABDOMINAL WALL/INGUINAL REGIONS:  Unremarkable  OSSEOUS STRUCTURES:  No acute fracture or destructive osseous lesion  Impression: Focal thickening and surrounding inflammatory change at the splenic flexure compatible with diverticulitis  No discrete fluid collection to suggest drainable abscess  No evidence of macro perforation  There is an adjacent enlarged lymph node present since 2016 however colonoscopy is recommended to exclude underlying colon neoplasm if not already performed  The study was marked in [facility] for immediate notification  Workstation performed: [id number]     Incidental Findings:   · Diverticulitis with adjacent enlarged lymph node     Test Results Pending at Discharge (will require follow up):   · As per After Visit Summary     Outpatient Tests Requested:  · Repeat colonoscopy    Complications:  Refer to hospital course and above listed diagnosis related plan, if any    Hospital Course:     Matty Argueta is a 62 y o  female patient who originally presented to the hospital on 10/10/2019 due to abdominal pain mostly in the left lower quadrant  She was recently treated for diverticulitis and discharged home on oral antibiotics  CT scan done in the ER showed findings suggestive of diverticulitis at the splenic flexure  Patient was admitted and started on IV antibiotics and seen by GI while here  Diet was advanced slowly as her symptoms improved  She was tolerating solids by day of discharge  She was cleared by GI prior to discharge home    Please see above list of diagnoses and related plan for additional information  Condition at Discharge: stable     Discharge Day Visit / Exam:     Subjective:  States abdominal pain has improved and she is tolerating her diet    Feels much better and would like to go home    Vitals: Blood Pressure: 110/69 (10/14/19 0831)  Pulse: 74 (10/14/19 0831)  Temperature: 98 3 °F (36 8 °C) (10/14/19 0831)  Temp Source: Oral (10/14/19 0831)  Respirations: 16 (10/14/19 6217)  Height: 5' 2" (157 5 cm) (10/11/19 0130)  Weight - Scale: 70 7 kg (155 lb 13 8 oz) (10/14/19 0600)  SpO2: 96 % (10/14/19 0905)  Exam:   Physical Exam   Constitutional: She is oriented to person, place, and time  She appears well-developed and well-nourished  No distress  HENT:   Head: Normocephalic and atraumatic  Mouth/Throat: Oropharynx is clear and moist    Eyes: EOM are normal  Right eye exhibits no discharge  Left eye exhibits no discharge  No scleral icterus  Cardiovascular: Normal rate and regular rhythm  Pulmonary/Chest: Effort normal and breath sounds normal  No respiratory distress  She has no wheezes  She has no rales  Abdominal: Soft  Bowel sounds are normal  She exhibits no distension  There is no tenderness  Musculoskeletal: She exhibits no edema  Neurological: She is alert and oriented to person, place, and time  No cranial nerve deficit  Skin: She is not diaphoretic  Discharge instructions/Information to patient and family:(Discharge Medications and Follow up):   See after visit summary for information provided to patient and family  Provisions for Follow-Up Care:  See after visit summary for information related to follow-up care and any pertinent home health orders  Disposition: Home    Planned Readmission:  No     Discharge Statement:  I spent > 30 minutes discharging the patient  This time was spent on the day of discharge  I had direct contact with the patient on the day of discharge  Greater than 50% of the total time was spent examining patient, answering all patient questions, arranging and discussing plan of care with patient as well as directly providing post-discharge instructions  Additional time then spent on discharge activities  Discharge Medications:  See after visit summary for reconciled discharge medications provided to patient and family  ** Please Note: "This note has been constructed using a voice recognition system  Therefore there may be syntax, spelling, and/or grammatical errors   Please call if you have any questions  "**

## 2019-10-14 NOTE — INCIDENTAL FINDINGS
The following findings require follow up:  Radiographic finding   Finding: Diverticulitis with adjacent enlarged lymph node       Please notify the following clinician to assist with the follow up:  Your gastroenterologist

## 2019-10-14 NOTE — DISCHARGE INSTRUCTIONS
Follow up with your own gastroenterologist as soon as possible after discharge and speak with them about repeat colonoscopy

## 2019-10-16 LAB
BACTERIA BLD CULT: NORMAL
BACTERIA BLD CULT: NORMAL

## 2022-01-05 ENCOUNTER — HOSPITAL ENCOUNTER (EMERGENCY)
Facility: HOSPITAL | Age: 62
Discharge: HOME | End: 2022-01-06
Attending: EMERGENCY MEDICINE
Payer: COMMERCIAL

## 2022-01-05 ENCOUNTER — APPOINTMENT (EMERGENCY)
Dept: RADIOLOGY | Facility: HOSPITAL | Age: 62
End: 2022-01-05
Payer: COMMERCIAL

## 2022-01-05 DIAGNOSIS — R07.9 CHEST PAIN: Primary | ICD-10-CM

## 2022-01-05 DIAGNOSIS — R05.9 COUGH: ICD-10-CM

## 2022-01-05 DIAGNOSIS — U07.1 COVID-19 VIRUS INFECTION: ICD-10-CM

## 2022-01-05 LAB
ALBUMIN SERPL BCP-MCNC: 3.7 G/DL (ref 3.5–5)
ALP SERPL-CCNC: 96 U/L (ref 46–116)
ALT SERPL W P-5'-P-CCNC: 39 U/L (ref 12–78)
ANION GAP SERPL CALCULATED.3IONS-SCNC: 7 MMOL/L (ref 4–13)
AST SERPL W P-5'-P-CCNC: 21 U/L (ref 5–45)
BASOPHILS # BLD AUTO: 0.01 THOUSANDS/ΜL (ref 0–0.1)
BASOPHILS NFR BLD AUTO: 0 % (ref 0–1)
BILIRUB SERPL-MCNC: 0.54 MG/DL (ref 0.2–1)
BUN SERPL-MCNC: 10 MG/DL (ref 5–25)
CALCIUM SERPL-MCNC: 8.2 MG/DL (ref 8.3–10.1)
CARDIAC TROPONIN I PNL SERPL HS: 3 NG/L
CHLORIDE SERPL-SCNC: 100 MMOL/L (ref 100–108)
CO2 SERPL-SCNC: 28 MMOL/L (ref 21–32)
CREAT SERPL-MCNC: 0.91 MG/DL (ref 0.6–1.3)
EOSINOPHIL # BLD AUTO: 0.03 THOUSAND/ΜL (ref 0–0.61)
EOSINOPHIL NFR BLD AUTO: 1 % (ref 0–6)
ERYTHROCYTE [DISTWIDTH] IN BLOOD BY AUTOMATED COUNT: 12.8 % (ref 11.6–15.1)
GFR SERPL CREATININE-BSD FRML MDRD: 67 ML/MIN/1.73SQ M
GLUCOSE SERPL-MCNC: 133 MG/DL (ref 65–140)
HCT VFR BLD AUTO: 41.4 % (ref 34.8–46.1)
HGB BLD-MCNC: 13.7 G/DL (ref 11.5–15.4)
IMM GRANULOCYTES # BLD AUTO: 0.01 THOUSAND/UL (ref 0–0.2)
IMM GRANULOCYTES NFR BLD AUTO: 0 % (ref 0–2)
LYMPHOCYTES # BLD AUTO: 1.24 THOUSANDS/ΜL (ref 0.6–4.47)
LYMPHOCYTES NFR BLD AUTO: 35 % (ref 14–44)
MCH RBC QN AUTO: 28.8 PG (ref 26.8–34.3)
MCHC RBC AUTO-ENTMCNC: 33.1 G/DL (ref 31.4–37.4)
MCV RBC AUTO: 87 FL (ref 82–98)
MONOCYTES # BLD AUTO: 0.36 THOUSAND/ΜL (ref 0.17–1.22)
MONOCYTES NFR BLD AUTO: 10 % (ref 4–12)
NEUTROPHILS # BLD AUTO: 1.93 THOUSANDS/ΜL (ref 1.85–7.62)
NEUTS SEG NFR BLD AUTO: 54 % (ref 43–75)
NRBC BLD AUTO-RTO: 0 /100 WBCS
NT-PROBNP SERPL-MCNC: 10 PG/ML
PLATELET # BLD AUTO: 139 THOUSANDS/UL (ref 149–390)
PMV BLD AUTO: 11.6 FL (ref 8.9–12.7)
POTASSIUM SERPL-SCNC: 3.3 MMOL/L (ref 3.5–5.3)
PROT SERPL-MCNC: 7.1 G/DL (ref 6.4–8.2)
RBC # BLD AUTO: 4.75 MILLION/UL (ref 3.81–5.12)
SODIUM SERPL-SCNC: 135 MMOL/L (ref 136–145)
WBC # BLD AUTO: 3.58 THOUSAND/UL (ref 4.31–10.16)

## 2022-01-05 PROCEDURE — 99285 EMERGENCY DEPT VISIT HI MDM: CPT | Performed by: EMERGENCY MEDICINE

## 2022-01-05 PROCEDURE — 85025 COMPLETE CBC W/AUTO DIFF WBC: CPT | Performed by: EMERGENCY MEDICINE

## 2022-01-05 PROCEDURE — 80053 COMPREHEN METABOLIC PANEL: CPT | Performed by: EMERGENCY MEDICINE

## 2022-01-05 PROCEDURE — 71045 X-RAY EXAM CHEST 1 VIEW: CPT

## 2022-01-05 PROCEDURE — 36415 COLL VENOUS BLD VENIPUNCTURE: CPT | Performed by: EMERGENCY MEDICINE

## 2022-01-05 PROCEDURE — 83880 ASSAY OF NATRIURETIC PEPTIDE: CPT | Performed by: EMERGENCY MEDICINE

## 2022-01-05 PROCEDURE — 84484 ASSAY OF TROPONIN QUANT: CPT | Performed by: EMERGENCY MEDICINE

## 2022-01-05 PROCEDURE — 99284 EMERGENCY DEPT VISIT MOD MDM: CPT

## 2022-01-05 RX ORDER — DIPHENOXYLATE HYDROCHLORIDE AND ATROPINE SULFATE 2.5; .025 MG/1; MG/1
1 TABLET ORAL DAILY
COMMUNITY

## 2022-01-06 VITALS
RESPIRATION RATE: 24 BRPM | DIASTOLIC BLOOD PRESSURE: 73 MMHG | OXYGEN SATURATION: 93 % | TEMPERATURE: 98.8 F | BODY MASS INDEX: 29.44 KG/M2 | SYSTOLIC BLOOD PRESSURE: 121 MMHG | HEART RATE: 84 BPM | WEIGHT: 160.94 LBS

## 2022-01-06 NOTE — ED NOTES
Pt seen, assessed and d/c by provider  Pt appeared to be in no acute distress upon discharge  Pt able to ambulate well without assistance upon exiting        Edwige Sánchez RN  01/06/22 1816

## 2022-01-06 NOTE — DISCHARGE INSTRUCTIONS
Return to the ER for further concerns or worsening symptoms  Follow up with your primary care physician in 1-2 days  Return to the emergency room for pulse oximetry reading of 91% or less, or increasing respiratory distress

## 2022-01-06 NOTE — ED PROVIDER NOTES
History  Chief Complaint   Patient presents with    Cough     had positive covid at home test 4 days ago  c/o constant cough tried to get into dr for steroid but they referred her here     Patient here with complaint of persistent cough and chest pressure  She states that she developed symptoms of COVID-19 last Thursday, had a positive outpatient test 4 days ago, and symptoms have persisted  Patient has been taking Mucinex without relief  She states she has daily fevers, but last took an antipyretic yesterday  Patient with sick contacts, has a  has similar symptoms  Patient is not vaccinated against COVID-19  She has a prior medical history of hypothyroidism, asthma, sleep apnea and diverticulitis  Patient is in no respiratory distress at time of initial evaluation  Pulse oximetry readings ranging from 96-98%  History provided by:  Patient   used: No    Cough  Cough characteristics:  Unable to specify  Sputum characteristics:  Unable to specify  Severity:  Moderate  Onset quality:  Gradual  Timing:  Constant  Progression:  Worsening  Chronicity:  New  Smoker: no    Relieved by:  Nothing  Worsened by:  Nothing  Ineffective treatments:  Decongestant  Associated symptoms: chest pain    Associated symptoms: no chills, no fever and no shortness of breath    Chest pain:     Quality: pressure      Severity:  Moderate    Onset quality:  Gradual    Timing:  Constant    Progression:  Unchanged    Chronicity:  New  Risk factors: recent infection        Prior to Admission Medications   Prescriptions Last Dose Informant Patient Reported? Taking? albuterol (2 5 mg/3 mL) 0 083 % nebulizer solution   Yes No   Sig: Take 2 5 mg by nebulization every 6 (six) hours as needed for wheezing   fluticasone-salmeterol (ADVAIR DISKUS, WIXELA INHUB) 250-50 mcg/dose inhaler  Self Yes No   Sig: Inhale 1 puff 2 (two) times a day Rinse mouth after use     fluticasone-salmeterol (Advair Diskus) 500-50 mcg/dose inhaler   Yes Yes   Sig: Inhale 1 puff daily Rinse mouth after use  levothyroxine 100 mcg tablet  Self Yes No   Sig: Take 112 mcg by mouth daily Monday through friday   levothyroxine 125 mcg tablet  Self Yes No   Sig: Take 125 mcg by mouth daily Taken q Saturday and    multivitamin (THERAGRAN) TABS   Yes Yes   Sig: Take 1 tablet by mouth daily   other medication, see sig,  Self Yes No   Sig: Take 1 tablet by mouth daily Medication/product name: Alan Mauro  Strength:   Sig (include dose, route, frequency):1 tablet daily   saccharomyces boulardii (FLORASTOR) 250 mg capsule   No No   Sig: Take 1 capsule (250 mg total) by mouth 2 (two) times a day      Facility-Administered Medications: None       Past Medical History:   Diagnosis Date    Asthma     Disease of thyroid gland     Diverticulitis     Sleep apnea        Past Surgical History:   Procedure Laterality Date     SECTION      x 3    CHOLECYSTECTOMY      KNEE SURGERY Left     OOPHORECTOMY Left        Family History   Problem Relation Age of Onset    Cancer Mother     Kidney disease Mother     Diabetes Father     Heart disease Father     Cancer Sister      I have reviewed and agree with the history as documented  E-Cigarette/Vaping    E-Cigarette Use Never User      E-Cigarette/Vaping Substances     Social History     Tobacco Use    Smoking status: Former Smoker     Types: Cigarettes     Quit date:      Years since quittin 0    Smokeless tobacco: Never Used   Vaping Use    Vaping Use: Never used   Substance Use Topics    Alcohol use: Yes     Alcohol/week: 2 0 standard drinks     Types: 2 Cans of beer per week     Comment: socially    Drug use: Never       Review of Systems   Constitutional: Negative for chills and fever  Respiratory: Positive for cough  Negative for chest tightness and shortness of breath  Cardiovascular: Positive for chest pain     Gastrointestinal: Negative for abdominal pain, diarrhea, nausea and vomiting  Genitourinary: Negative for dysuria, frequency, hematuria and urgency  Musculoskeletal: Negative for back pain, neck pain and neck stiffness  All other systems reviewed and are negative  Physical Exam  Physical Exam  Vitals and nursing note reviewed  Constitutional:       General: She is not in acute distress  Appearance: She is well-developed  She is not diaphoretic  HENT:      Head: Normocephalic and atraumatic  Eyes:      Conjunctiva/sclera: Conjunctivae normal       Pupils: Pupils are equal, round, and reactive to light  Cardiovascular:      Rate and Rhythm: Normal rate and regular rhythm  Heart sounds: Normal heart sounds  No murmur heard  Pulmonary:      Effort: Pulmonary effort is normal  No respiratory distress  Breath sounds: Normal breath sounds  Abdominal:      General: Bowel sounds are normal  There is no distension  Palpations: Abdomen is soft  Tenderness: There is no abdominal tenderness  Musculoskeletal:         General: No deformity  Normal range of motion  Cervical back: Normal range of motion and neck supple  Skin:     General: Skin is warm and dry  Capillary Refill: Capillary refill takes less than 2 seconds  Coloration: Skin is not pale  Findings: No rash  Neurological:      General: No focal deficit present  Mental Status: She is alert and oriented to person, place, and time  Cranial Nerves: No cranial nerve deficit     Psychiatric:         Behavior: Behavior normal          Vital Signs  ED Triage Vitals [01/05/22 1834]   Temperature Pulse Respirations Blood Pressure SpO2   98 8 °F (37 1 °C) 100 (!) 24 135/77 95 %      Temp Source Heart Rate Source Patient Position - Orthostatic VS BP Location FiO2 (%)   Tympanic Monitor Sitting Right arm --      Pain Score       8           Vitals:    01/05/22 2215 01/05/22 2230 01/05/22 2309 01/06/22 0000   BP:   121/73    Pulse: 90 90 86 84   Patient Position - Orthostatic VS:   Sitting          Visual Acuity      ED Medications  Medications - No data to display    Diagnostic Studies  Results Reviewed     Procedure Component Value Units Date/Time    HS Troponin 0hr (reflex protocol) [[id number]]  (Normal) Collected: 01/05/22 2302    Lab Status: Final result Specimen: Blood from Arm, Left Updated: 01/05/22 2355     hs TnI 0hr 3 ng/L     NT-BNP PRO [[id number]]  (Normal) Collected: 01/05/22 2302    Lab Status: Final result Specimen: Blood from Arm, Left Updated: 01/05/22 2336     NT-proBNP 10 pg/mL     Comprehensive metabolic panel [[id number]]  (Abnormal) Collected: 01/05/22 2302    Lab Status: Final result Specimen: Blood from Arm, Left Updated: 01/05/22 2330     Sodium 135 mmol/L      Potassium 3 3 mmol/L      Chloride 100 mmol/L      CO2 28 mmol/L      ANION GAP 7 mmol/L      BUN 10 mg/dL      Creatinine 0 91 mg/dL      Glucose 133 mg/dL      Calcium 8 2 mg/dL      AST 21 U/L      ALT 39 U/L      Alkaline Phosphatase 96 U/L      Total Protein 7 1 g/dL      Albumin 3 7 g/dL      Total Bilirubin 0 54 mg/dL      eGFR 67 ml/min/1 73sq m     Narrative:      Christine guidelines for Chronic Kidney Disease (CKD):     Stage 1 with normal or high GFR (GFR > 90 mL/min/1 73 square meters)    Stage 2 Mild CKD (GFR = 60-89 mL/min/1 73 square meters)    Stage 3A Moderate CKD (GFR = 45-59 mL/min/1 73 square meters)    Stage 3B Moderate CKD (GFR = 30-44 mL/min/1 73 square meters)    Stage 4 Severe CKD (GFR = 15-29 mL/min/1 73 square meters)    Stage 5 End Stage CKD (GFR <15 mL/min/1 73 square meters)  Note: GFR calculation is accurate only with a steady state creatinine    CBC and differential [[id number]]  (Abnormal) Collected: 01/05/22 2302    Lab Status: Final result Specimen: Blood from Arm, Left Updated: 01/05/22 2307     WBC 3 58 Thousand/uL      RBC 4 75 Million/uL      Hemoglobin 13 7 g/dL      Hematocrit 41 4 %      MCV 87 fL      MCH 28 8 pg      MCHC 33 1 g/dL      RDW 12 8 %      MPV 11 6 fL      Platelets 051 Thousands/uL      nRBC 0 /100 WBCs      Neutrophils Relative 54 %      Immat GRANS % 0 %      Lymphocytes Relative 35 %      Monocytes Relative 10 %      Eosinophils Relative 1 %      Basophils Relative 0 %      Neutrophils Absolute 1 93 Thousands/µL      Immature Grans Absolute 0 01 Thousand/uL      Lymphocytes Absolute 1 24 Thousands/µL      Monocytes Absolute 0 36 Thousand/µL      Eosinophils Absolute 0 03 Thousand/µL      Basophils Absolute 0 01 Thousands/µL                  XR chest 1 view portable   ED Interpretation by Moisés Sanz DO (01/06 0005)   Changes consistent with COVID-19      Final Result by Staci Maguire MD (01/06 6296)      Faint groundglass opacities in the right upper lobe concerning for Covid 19 pneumonia  Workstation performed: [id number]                    Procedures  ECG 12 Lead Documentation Only    Date/Time: 1/5/2022 11:08 PM  Performed by: Moisés Sanz DO  Authorized by: Moisés Sanz DO     Indications / Diagnosis:  Cp  ECG reviewed by me, the ED Provider: yes    Patient location:  ED  Previous ECG:     Previous ECG:  Unavailable    Comparison to cardiac monitor: Yes    Interpretation:     Interpretation: normal    Rate:     ECG rate:  86bpm    ECG rate assessment: normal    Rhythm:     Rhythm: sinus rhythm    Ectopy:     Ectopy: none    QRS:     QRS axis:  Normal  Conduction:     Conduction: normal    ST segments:     ST segments:  Normal  T waves:     T waves: normal               ED Course                               SBIRT 22yo+      Most Recent Value   SBIRT (22 yo +)    In order to provide better care to our patients, we are screening all of our patients for alcohol and drug use  Would it be okay to ask you these screening questions?  No Filed at: 01/05/2022 2307                    [facility]  Number of Diagnoses or Management Options  Chest pain: new and requires workup  Cough: new and requires workup  COVID-19 virus infection: new and requires workup     Amount and/or Complexity of Data Reviewed  Clinical lab tests: ordered and reviewed  Tests in the radiology section of CPT®: ordered and reviewed    Risk of Complications, Morbidity, and/or Mortality  Presenting problems: high  Diagnostic procedures: high  Management options: high    Patient Progress  Patient progress: stable      Disposition  Final diagnoses:   Chest pain   Cough   COVID-19 virus infection     Time reflects when diagnosis was documented in both MDM as applicable and the Disposition within this note     Time User Action Codes Description Comment    1/6/2022 12:06 AM Trinh [place] O Add [R07 9] Chest pain     1/6/2022 12:06 AM Trinh [place] O Add [R05 9] Cough     1/6/2022 12:06 AM Trinh [place] O Add [U07 1] COVID-19 virus infection       ED Disposition     ED Disposition Condition Date/Time Comment    Discharge Stable Thu Jan 6, 2022 12:05 AM Dinah Meyer discharge to home/self care              Follow-up Information     Follow up With Specialties Details Why Contact Vincent Turpin MD  Schedule an appointment as soon as possible for a visit in 2 days for follow up [address] [place] [address] [phone]  [phone]            Discharge Medication List as of 1/6/2022 12:06 AM      CONTINUE these medications which have NOT CHANGED    Details   fluticasone-salmeterol (Advair Diskus) 500-50 mcg/dose inhaler Inhale 1 puff daily Rinse mouth after use , Historical Med      multivitamin (THERAGRAN) TABS Take 1 tablet by mouth daily, Historical Med      albuterol (2 5 mg/3 mL) 0 083 % nebulizer solution Take 2 5 mg by nebulization every 6 (six) hours as needed for wheezing, Until Discontinued, Historical Med      fluticasone-salmeterol (ADVAIR DISKUS, WIXELA INHUB) 250-50 mcg/dose inhaler Inhale 1 puff 2 (two) times a day Rinse mouth after use , Historical Med      !! levothyroxine 100 mcg tablet Take 112 mcg by mouth daily Monday through friday, Historical Med      !! levothyroxine 125 mcg tablet Take 125 mcg by mouth daily Taken q Saturday and sunday, Historical Med      other medication, see sig, Take 1 tablet by mouth daily Medication/product name: Red Arm  Strength:   Sig (include dose, route, frequency):1 tablet daily, Historical Med      saccharomyces boulardii (FLORASTOR) 250 mg capsule Take 1 capsule (250 mg total) by mouth 2 (two) times a day, Starting Mon 10/14/2019, Print       !! - Potential duplicate medications found  Please discuss with provider  No discharge procedures on file      PDMP Review     None          ED Provider  Electronically Signed by           Consuelo Pierce DO  01/07/22 3394

## 2022-01-09 ENCOUNTER — HOSPITAL ENCOUNTER (INPATIENT)
Facility: HOSPITAL | Age: 62
LOS: 4 days | Discharge: HOME | DRG: 177 | End: 2022-01-13
Attending: EMERGENCY MEDICINE | Admitting: STUDENT IN AN ORGANIZED HEALTH CARE EDUCATION/TRAINING PROGRAM
Payer: COMMERCIAL

## 2022-01-09 ENCOUNTER — APPOINTMENT (INPATIENT)
Dept: RADIOLOGY | Facility: HOSPITAL | Age: 62
DRG: 177 | End: 2022-01-09
Payer: COMMERCIAL

## 2022-01-09 DIAGNOSIS — U07.1 COVID: Primary | ICD-10-CM

## 2022-01-09 PROBLEM — J96.01 ACUTE RESPIRATORY FAILURE WITH HYPOXIA (HCC): Status: ACTIVE | Noted: 2022-01-09

## 2022-01-09 LAB
ALBUMIN SERPL BCP-MCNC: 3.3 G/DL (ref 3.5–5)
ALP SERPL-CCNC: 82 U/L (ref 46–116)
ALT SERPL W P-5'-P-CCNC: 33 U/L (ref 12–78)
ANION GAP SERPL CALCULATED.3IONS-SCNC: 9 MMOL/L (ref 4–13)
AST SERPL W P-5'-P-CCNC: 25 U/L (ref 5–45)
BASOPHILS # BLD AUTO: 0.01 THOUSANDS/ΜL (ref 0–0.1)
BASOPHILS NFR BLD AUTO: 0 % (ref 0–1)
BILIRUB SERPL-MCNC: 0.67 MG/DL (ref 0.2–1)
BUN SERPL-MCNC: 16 MG/DL (ref 5–25)
CALCIUM ALBUM COR SERPL-MCNC: 8.8 MG/DL (ref 8.3–10.1)
CALCIUM SERPL-MCNC: 8.2 MG/DL (ref 8.3–10.1)
CARDIAC TROPONIN I PNL SERPL HS: 4 NG/L
CHLORIDE SERPL-SCNC: 101 MMOL/L (ref 100–108)
CK MB SERPL-MCNC: <1 % (ref 0–2.5)
CK MB SERPL-MCNC: <1 NG/ML (ref 0–5)
CK SERPL-CCNC: 142 U/L (ref 26–192)
CO2 SERPL-SCNC: 27 MMOL/L (ref 21–32)
CREAT SERPL-MCNC: 0.84 MG/DL (ref 0.6–1.3)
CRP SERPL QL: 30.1 MG/L
D DIMER PPP FEU-MCNC: 0.54 UG/ML FEU
EOSINOPHIL # BLD AUTO: 0 THOUSAND/ΜL (ref 0–0.61)
EOSINOPHIL NFR BLD AUTO: 0 % (ref 0–6)
ERYTHROCYTE [DISTWIDTH] IN BLOOD BY AUTOMATED COUNT: 12.7 % (ref 11.6–15.1)
GFR SERPL CREATININE-BSD FRML MDRD: 74 ML/MIN/1.73SQ M
GLUCOSE SERPL-MCNC: 107 MG/DL (ref 65–140)
HCT VFR BLD AUTO: 37.3 % (ref 34.8–46.1)
HGB BLD-MCNC: 12.1 G/DL (ref 11.5–15.4)
IMM GRANULOCYTES # BLD AUTO: 0.06 THOUSAND/UL (ref 0–0.2)
IMM GRANULOCYTES NFR BLD AUTO: 1 % (ref 0–2)
LYMPHOCYTES # BLD AUTO: 1.06 THOUSANDS/ΜL (ref 0.6–4.47)
LYMPHOCYTES NFR BLD AUTO: 15 % (ref 14–44)
MCH RBC QN AUTO: 28.7 PG (ref 26.8–34.3)
MCHC RBC AUTO-ENTMCNC: 32.4 G/DL (ref 31.4–37.4)
MCV RBC AUTO: 89 FL (ref 82–98)
MONOCYTES # BLD AUTO: 0.43 THOUSAND/ΜL (ref 0.17–1.22)
MONOCYTES NFR BLD AUTO: 6 % (ref 4–12)
NEUTROPHILS # BLD AUTO: 5.62 THOUSANDS/ΜL (ref 1.85–7.62)
NEUTS SEG NFR BLD AUTO: 78 % (ref 43–75)
NRBC BLD AUTO-RTO: 0 /100 WBCS
NT-PROBNP SERPL-MCNC: 281 PG/ML
PLATELET # BLD AUTO: 193 THOUSANDS/UL (ref 149–390)
PMV BLD AUTO: 11.3 FL (ref 8.9–12.7)
POTASSIUM SERPL-SCNC: 3.7 MMOL/L (ref 3.5–5.3)
PROT SERPL-MCNC: 6.7 G/DL (ref 6.4–8.2)
RBC # BLD AUTO: 4.21 MILLION/UL (ref 3.81–5.12)
SODIUM SERPL-SCNC: 137 MMOL/L (ref 136–145)
WBC # BLD AUTO: 7.18 THOUSAND/UL (ref 4.31–10.16)

## 2022-01-09 PROCEDURE — 71045 X-RAY EXAM CHEST 1 VIEW: CPT

## 2022-01-09 PROCEDURE — 86140 C-REACTIVE PROTEIN: CPT | Performed by: PHYSICIAN ASSISTANT

## 2022-01-09 PROCEDURE — 80053 COMPREHEN METABOLIC PANEL: CPT | Performed by: PHYSICIAN ASSISTANT

## 2022-01-09 PROCEDURE — 84484 ASSAY OF TROPONIN QUANT: CPT | Performed by: PHYSICIAN ASSISTANT

## 2022-01-09 PROCEDURE — 99285 EMERGENCY DEPT VISIT HI MDM: CPT | Performed by: PHYSICIAN ASSISTANT

## 2022-01-09 PROCEDURE — 82553 CREATINE MB FRACTION: CPT | Performed by: PHYSICIAN ASSISTANT

## 2022-01-09 PROCEDURE — 85379 FIBRIN DEGRADATION QUANT: CPT | Performed by: PHYSICIAN ASSISTANT

## 2022-01-09 PROCEDURE — 83880 ASSAY OF NATRIURETIC PEPTIDE: CPT | Performed by: PHYSICIAN ASSISTANT

## 2022-01-09 PROCEDURE — 93005 ELECTROCARDIOGRAM TRACING: CPT

## 2022-01-09 PROCEDURE — 85025 COMPLETE CBC W/AUTO DIFF WBC: CPT | Performed by: PHYSICIAN ASSISTANT

## 2022-01-09 PROCEDURE — 96374 THER/PROPH/DIAG INJ IV PUSH: CPT

## 2022-01-09 PROCEDURE — 36415 COLL VENOUS BLD VENIPUNCTURE: CPT | Performed by: PHYSICIAN ASSISTANT

## 2022-01-09 PROCEDURE — 99284 EMERGENCY DEPT VISIT MOD MDM: CPT

## 2022-01-09 PROCEDURE — 82550 ASSAY OF CK (CPK): CPT | Performed by: PHYSICIAN ASSISTANT

## 2022-01-09 PROCEDURE — 99223 1ST HOSP IP/OBS HIGH 75: CPT | Performed by: STUDENT IN AN ORGANIZED HEALTH CARE EDUCATION/TRAINING PROGRAM

## 2022-01-09 RX ORDER — SACCHAROMYCES BOULARDII 250 MG
250 CAPSULE ORAL 2 TIMES DAILY
Status: DISCONTINUED | OUTPATIENT
Start: 2022-01-09 | End: 2022-01-13 | Stop reason: HOSPADM

## 2022-01-09 RX ORDER — ONDANSETRON 2 MG/ML
4 INJECTION INTRAMUSCULAR; INTRAVENOUS EVERY 6 HOURS PRN
Status: DISCONTINUED | OUTPATIENT
Start: 2022-01-09 | End: 2022-01-13 | Stop reason: HOSPADM

## 2022-01-09 RX ORDER — ALBUTEROL SULFATE 2.5 MG/3ML
2.5 SOLUTION RESPIRATORY (INHALATION) EVERY 6 HOURS PRN
Status: DISCONTINUED | OUTPATIENT
Start: 2022-01-09 | End: 2022-01-13 | Stop reason: HOSPADM

## 2022-01-09 RX ORDER — BENZONATATE 100 MG/1
100 CAPSULE ORAL 3 TIMES DAILY PRN
Status: DISCONTINUED | OUTPATIENT
Start: 2022-01-09 | End: 2022-01-11

## 2022-01-09 RX ORDER — LEVOTHYROXINE SODIUM 112 UG/1
112 TABLET ORAL DAILY
Status: DISCONTINUED | OUTPATIENT
Start: 2022-01-09 | End: 2022-01-09

## 2022-01-09 RX ORDER — DEXAMETHASONE SODIUM PHOSPHATE 4 MG/ML
6 INJECTION, SOLUTION INTRA-ARTICULAR; INTRALESIONAL; INTRAMUSCULAR; INTRAVENOUS; SOFT TISSUE ONCE
Status: DISCONTINUED | OUTPATIENT
Start: 2022-01-09 | End: 2022-01-09

## 2022-01-09 RX ORDER — ACETAMINOPHEN 325 MG/1
650 TABLET ORAL EVERY 6 HOURS PRN
Status: DISCONTINUED | OUTPATIENT
Start: 2022-01-09 | End: 2022-01-13 | Stop reason: HOSPADM

## 2022-01-09 RX ORDER — LEVOTHYROXINE SODIUM 0.12 MG/1
125 TABLET ORAL DAILY
Status: DISCONTINUED | OUTPATIENT
Start: 2022-01-09 | End: 2022-01-09

## 2022-01-09 RX ORDER — LEVOTHYROXINE SODIUM 112 UG/1
112 TABLET ORAL
Status: DISCONTINUED | OUTPATIENT
Start: 2022-01-11 | End: 2022-01-09

## 2022-01-09 RX ORDER — FLUTICASONE FUROATE AND VILANTEROL 200; 25 UG/1; UG/1
1 POWDER RESPIRATORY (INHALATION)
Status: DISCONTINUED | OUTPATIENT
Start: 2022-01-09 | End: 2022-01-13 | Stop reason: HOSPADM

## 2022-01-09 RX ORDER — GUAIFENESIN/DEXTROMETHORPHAN 100-10MG/5
10 SYRUP ORAL EVERY 4 HOURS PRN
Status: DISCONTINUED | OUTPATIENT
Start: 2022-01-09 | End: 2022-01-13 | Stop reason: HOSPADM

## 2022-01-09 RX ORDER — ONDANSETRON 2 MG/ML
4 INJECTION INTRAMUSCULAR; INTRAVENOUS ONCE
Status: COMPLETED | OUTPATIENT
Start: 2022-01-09 | End: 2022-01-09

## 2022-01-09 RX ORDER — DEXAMETHASONE SODIUM PHOSPHATE 4 MG/ML
6 INJECTION, SOLUTION INTRA-ARTICULAR; INTRALESIONAL; INTRAMUSCULAR; INTRAVENOUS; SOFT TISSUE ONCE
Status: COMPLETED | OUTPATIENT
Start: 2022-01-09 | End: 2022-01-09

## 2022-01-09 RX ORDER — DEXAMETHASONE SODIUM PHOSPHATE 4 MG/ML
6 INJECTION, SOLUTION INTRA-ARTICULAR; INTRALESIONAL; INTRAMUSCULAR; INTRAVENOUS; SOFT TISSUE EVERY 24 HOURS
Status: DISCONTINUED | OUTPATIENT
Start: 2022-01-10 | End: 2022-01-13 | Stop reason: HOSPADM

## 2022-01-09 RX ORDER — LEVOTHYROXINE SODIUM 112 UG/1
112 TABLET ORAL
Status: DISCONTINUED | OUTPATIENT
Start: 2022-01-10 | End: 2022-01-13 | Stop reason: HOSPADM

## 2022-01-09 RX ADMIN — Medication 250 MG: at 17:12

## 2022-01-09 RX ADMIN — REMDESIVIR 200 MG: 100 INJECTION, POWDER, LYOPHILIZED, FOR SOLUTION INTRAVENOUS at 15:14

## 2022-01-09 RX ADMIN — ONDANSETRON 4 MG: 2 INJECTION INTRAMUSCULAR; INTRAVENOUS at 13:45

## 2022-01-09 RX ADMIN — ACETAMINOPHEN 650 MG: 325 TABLET, FILM COATED ORAL at 15:13

## 2022-01-09 RX ADMIN — SODIUM CHLORIDE 1000 ML: 0.9 INJECTION, SOLUTION INTRAVENOUS at 13:44

## 2022-01-09 RX ADMIN — FLUTICASONE FUROATE AND VILANTEROL TRIFENATATE 1 PUFF: 200; 25 POWDER RESPIRATORY (INHALATION) at 15:13

## 2022-01-09 RX ADMIN — DEXAMETHASONE SODIUM PHOSPHATE 6 MG: 4 INJECTION, SOLUTION INTRA-ARTICULAR; INTRALESIONAL; INTRAMUSCULAR; INTRAVENOUS; SOFT TISSUE at 12:28

## 2022-01-09 RX ADMIN — GUAIFENESIN AND DEXTROMETHORPHAN 10 ML: 100; 10 SYRUP ORAL at 23:01

## 2022-01-09 RX ADMIN — ENOXAPARIN SODIUM 40 MG: 40 INJECTION SUBCUTANEOUS at 15:13

## 2022-01-09 NOTE — ED PROVIDER NOTES
History  No chief complaint on file  Patient is a 58-year-old white female with onset 11 days ago of fever 100 2, cough, shortness of breath and weakness  Was diagnosed with COVID on   She presents to the ED today stating fever of 103 last night and feeling near syncopal when she gets out of bed this morning  She states she has been monitoring  her oxygenation on a home pulse ox monitor and went to 87-88% on room air this morning  She states she is nauseous and has a poor appetite  Denies vomiting  She has had some diarrhea  No other complaints          Prior to Admission Medications   Prescriptions Last Dose Informant Patient Reported? Taking? albuterol (2 5 mg/3 mL) 0 083 % nebulizer solution   Yes No   Sig: Take 2 5 mg by nebulization every 6 (six) hours as needed for wheezing   fluticasone-salmeterol (ADVAIR DISKUS, WIXELA INHUB) 250-50 mcg/dose inhaler  Self Yes No   Sig: Inhale 1 puff 2 (two) times a day Rinse mouth after use  fluticasone-salmeterol (Advair Diskus) 500-50 mcg/dose inhaler   Yes No   Sig: Inhale 1 puff daily Rinse mouth after use     levothyroxine 100 mcg tablet  Self Yes No   Sig: Take 112 mcg by mouth daily Monday through friday   levothyroxine 125 mcg tablet  Self Yes No   Sig: Take 125 mcg by mouth daily Taken q Saturday and    multivitamin (THERAGRAN) TABS   Yes No   Sig: Take 1 tablet by mouth daily   other medication, see sig,  Self Yes No   Sig: Take 1 tablet by mouth daily Medication/product name: Nanci Almonte  Strength:   Sig (include dose, route, frequency):1 tablet daily   saccharomyces boulardii (FLORASTOR) 250 mg capsule   No No   Sig: Take 1 capsule (250 mg total) by mouth 2 (two) times a day      Facility-Administered Medications: None       Past Medical History:   Diagnosis Date    Asthma     Disease of thyroid gland     Diverticulitis     Sleep apnea        Past Surgical History:   Procedure Laterality Date     SECTION      x 3    CHOLECYSTECTOMY      KNEE SURGERY Left     OOPHORECTOMY Left        Family History   Problem Relation Age of Onset   [facility] Cancer Mother     Kidney disease Mother     Diabetes Father     Heart disease Father     Cancer Sister      I have reviewed and agree with the history as documented  E-Cigarette/Vaping    E-Cigarette Use Never User      E-Cigarette/Vaping Substances     Social History     Tobacco Use    Smoking status: Former Smoker     Types: Cigarettes     Quit date:      Years since quittin 0    Smokeless tobacco: Never Used   Vaping Use    Vaping Use: Never used   Substance Use Topics    Alcohol use: Yes     Alcohol/week: 2 0 standard drinks     Types: 2 Cans of beer per week     Comment: socially    Drug use: Never       Review of Systems   Constitutional: Positive for fever  Negative for chills  HENT: Negative for ear pain and sore throat  Respiratory: Positive for cough and shortness of breath  Cardiovascular: Negative for chest pain and palpitations  Gastrointestinal: Positive for diarrhea and nausea  Negative for abdominal pain and vomiting  Genitourinary: Negative for dysuria and hematuria  Musculoskeletal: Negative for arthralgias, back pain and neck pain  Skin: Negative for color change and rash  Neurological: Negative for syncope and headaches  All other systems reviewed and are negative  Physical Exam  Physical Exam  Vitals and nursing note reviewed  Constitutional:       General: She is not in acute distress  Appearance: Normal appearance  She is not ill-appearing, toxic-appearing or diaphoretic  HENT:      Head: Normocephalic and atraumatic  Right Ear: Tympanic membrane, ear canal and external ear normal       Left Ear: Tympanic membrane, ear canal and external ear normal       Nose: Nose normal       Mouth/Throat:      Comments: Dry tongue   Eyes:      Extraocular Movements: Extraocular movements intact        Conjunctiva/sclera: Conjunctivae normal       Pupils: Pupils are equal, round, and reactive to light  Cardiovascular:      Rate and Rhythm: Normal rate and regular rhythm  Pulses: Normal pulses  Heart sounds: Normal heart sounds  Pulmonary:      Effort: Pulmonary effort is normal       Breath sounds: Normal breath sounds  Abdominal:      General: Abdomen is flat  Bowel sounds are normal       Palpations: Abdomen is soft  Musculoskeletal:         General: Normal range of motion  Cervical back: Normal range of motion and neck supple  Skin:     General: Skin is warm and dry  Capillary Refill: Capillary refill takes less than 2 seconds  Neurological:      Mental Status: She is alert and oriented to person, place, and time           Vital Signs  ED Triage Vitals [01/09/22 1118]   Temperature Pulse Respirations Blood Pressure SpO2   99 2 °F (37 3 °C) 84 22 121/64 94 %      Temp Source Heart Rate Source Patient Position - Orthostatic VS BP Location FiO2 (%)   Oral Monitor Lying Left arm --      Pain Score       --           Vitals:    01/09/22 1118 01/09/22 1200   BP: 121/64 124/55   Pulse: 84 84   Patient Position - Orthostatic VS: Lying Lying         Visual Acuity      ED Medications  Medications   dexamethasone (DECADRON) injection 6 mg (6 mg Intravenous Given 1/9/22 1228)       Diagnostic Studies  Results Reviewed     Procedure Component Value Units Date/Time    D-Dimer [[id number]]  (Abnormal) Collected: 01/09/22 1227    Lab Status: Final result Specimen: Blood from Arm, Right Updated: 01/09/22 1252     D-Dimer, Quant 0 54 ug/ml FEU     CBC and differential [[id number]]  (Abnormal) Collected: 01/09/22 1227    Lab Status: Final result Specimen: Blood from Arm, Right Updated: 01/09/22 1237     WBC 7 18 Thousand/uL      RBC 4 21 Million/uL      Hemoglobin 12 1 g/dL      Hematocrit 37 3 %      MCV 89 fL      MCH 28 7 pg      MCHC 32 4 g/dL      RDW 12 7 %      MPV 11 3 fL      Platelets 886 Thousands/uL      nRBC 0 /100 WBCs      Neutrophils Relative 78 %      Immat GRANS % 1 %      Lymphocytes Relative 15 %      Monocytes Relative 6 %      Eosinophils Relative 0 %      Basophils Relative 0 %      Neutrophils Absolute 5 62 Thousands/µL      Immature Grans Absolute 0 06 Thousand/uL      Lymphocytes Absolute 1 06 Thousands/µL      Monocytes Absolute 0 43 Thousand/µL      Eosinophils Absolute 0 00 Thousand/µL      Basophils Absolute 0 01 Thousands/µL     HS Troponin 0hr (reflex protocol) [[id number]] Collected: 01/09/22 1227    Lab Status: In process Specimen: Blood from Arm, Right Updated: 01/09/22 1235    Comprehensive metabolic panel [[id number]] Collected: 01/09/22 1227    Lab Status: In process Specimen: Blood from Arm, Right Updated: 01/09/22 1235    C-reactive protein [[id number]] Collected: 01/09/22 1227    Lab Status: In process Specimen: Blood from Arm, Right Updated: 01/09/22 1235    NT-BNP PRO [[id number]] Collected: 01/09/22 1227    Lab Status: In process Specimen: Blood from Arm, Right Updated: 01/09/22 1235    CK Total with Reflex CKMB [[id number]] Collected: 01/09/22 1227    Lab Status: In process Specimen: Blood from Arm, Right Updated: 01/09/22 1235                 XR chest 1 view portable    (Results Pending)              Procedures  ECG 12 Lead Documentation Only    Date/Time: 1/9/2022 12:46 PM  Performed by: Kennedy Banegas PA-C  Authorized by: Kennedy Banegas PA-C     ECG reviewed by me, the ED Provider: yes    Patient location:  ED  Interpretation:     Interpretation: normal    Rate:     ECG rate:  90    ECG rate assessment: normal    Rhythm:     Rhythm: sinus rhythm    QRS:     QRS axis:  Normal  Conduction:     Conduction: normal    ST segments:     ST segments:  Normal  T waves:     T waves: normal               ED Course  ED Course as of 01/09/22 1253   Sun Jan 09, 2022   1252 Patient on vaccinated for COVID  Patient is hypoxic with exertion  Discussed with hospitalist Dr Fior Armstrong     Patient to be admitted under service of Dr Corey Lopez                                             [facility]  Number of Diagnoses or Management Options  Diagnosis management comments: COVID positive with hypoxia on exertion  IV Decadron  Admitted to hospital service       Amount and/or Complexity of Data Reviewed  Clinical lab tests: reviewed  Tests in the radiology section of CPT®: reviewed  Tests in the medicine section of CPT®: reviewed  Decide to obtain previous medical records or to obtain history from someone other than the patient: yes  Review and summarize past medical records: yes  Independent visualization of images, tracings, or specimens: yes    Risk of Complications, Morbidity, and/or Mortality  Presenting problems: moderate  Diagnostic procedures: moderate  Management options: moderate    Patient Progress  Patient progress: stable      Disposition  Final diagnoses:   None     ED Disposition     None      Follow-up Information    None         Patient's Medications   Discharge Prescriptions    No medications on file       No discharge procedures on file      PDMP Review     None          ED Provider  Electronically Signed by           Nan Torres PA-C  01/09/22 8983

## 2022-01-09 NOTE — ED NOTES
Patient oxygen level checked  Saturation reading 93% (please note patient also has gel nails on)   No other signs of acute distress at this time     Desirae Mccauley RN  01/09/22 0981

## 2022-01-09 NOTE — H&P
Sudarshan 43  H&P- Sweta Bruce 1960, 59 y o  female MRN: [id number]  Unit/Bed#: ED 06 Encounter: [id number]  Primary Care Provider: Denver Freitas MD   Date and time admitted to hospital: 1/9/2022 11:18 AM    * Acute respiratory failure with hypoxia [facility])  Assessment & Plan  Likely due to COVID-19 in setting of history of asthma    COVID-19  Assessment & Plan  Initiate COVID protocol with IV steroids and remdesivir  Anticoagulation  Supplemental oxygen  Incentive spirometer and encourage proning  Trend inflammatory markers    BMI 30 0-30 9,adult  Assessment & Plan  Lifestyle modifications  Outpatient follow-up    Asthma  Assessment & Plan  Continue Breo and p r n  Nebulizer    Hypothyroid  Assessment & Plan  Continue home Synthroid      VTE Pharmacologic Prophylaxis:   Moderate Risk (Score 3-4) - Pharmacological DVT Prophylaxis Ordered: enoxaparin (Lovenox)  Code Status: Level 1 - Full Code     Anticipated Length of Stay: Patient will be admitted on an inpatient basis with an anticipated length of stay of greater than 2 midnights secondary to Cesar  Total Time for Visit, including Counseling / Coordination of Care: 45 minutes Greater than 50% of this total time spent on direct patient counseling and coordination of care  Chief Complaint:  Shortness of breath    History of Present Illness:  Sweta Bruce is a 59 y o  female with a PMH of asthma and hypothyroidism presenting with shortness of breath and cough associated with nausea and loose stools  Patient reports that her symptoms firstbegan around December 30th  Patient is unvaccinated  Patient was monitoring her oxygen saturation at home and noted to be around 87-88% on room air  Review of Systems:  Review of Systems   Constitutional: Positive for fatigue  Negative for chills and fever  HENT: Negative for rhinorrhea and sore throat  Respiratory: Positive for cough and shortness of breath      Cardiovascular: Negative for chest pain and leg swelling  Gastrointestinal: Positive for nausea  Negative for abdominal pain, constipation, diarrhea and vomiting  Genitourinary: Negative for dysuria and hematuria  Neurological: Negative for dizziness, syncope and headaches  Psychiatric/Behavioral: Negative for agitation  The patient is not nervous/anxious  Past Medical and Surgical History:   Past Medical History:   Diagnosis Date    Asthma     Disease of thyroid gland     Diverticulitis     Sleep apnea        Past Surgical History:   Procedure Laterality Date     SECTION      x 3    CHOLECYSTECTOMY      KNEE SURGERY Left     OOPHORECTOMY Left        Meds/Allergies:  Prior to Admission medications    Medication Sig Start Date End Date Taking? Authorizing Provider   albuterol (2 5 mg/3 mL) 0 083 % nebulizer solution Take 2 5 mg by nebulization every 6 (six) hours as needed for wheezing    Historical Provider, MD   fluticasone-salmeterol (Advair Diskus) 500-50 mcg/dose inhaler Inhale 1 puff daily Rinse mouth after use  Historical Provider, MD   fluticasone-salmeterol (ADVAIR DISKUS, WIXELA INHUB) 250-50 mcg/dose inhaler Inhale 1 puff 2 (two) times a day Rinse mouth after use  Historical Provider, MD   levothyroxine 100 mcg tablet Take 112 mcg by mouth daily Monday through  [facility] Provider, MD   levothyroxine 125 mcg tablet Take 125 mcg by mouth daily Taken q Saturday and     Historical Provider, MD   multivitamin (THERAGRAN) TABS Take 1 tablet by mouth daily    Historical Provider, MD   other medication, see sig, Take 1 tablet by mouth daily Medication/product name: Elaina John  Strength:   Sig (include dose, route, frequency):1 tablet daily    Historical Provider, MD   saccharomyces boulardii (FLORASTOR) 250 mg capsule Take 1 capsule (250 mg total) by mouth 2 (two) times a day 10/14/19   [facility], DO     I have reviewed home medications with patient personally      Allergies: Allergies   Allergen Reactions    Contrast [Iodinated Diagnostic Agents] Itching    Shellfish-Derived Products - Food Allergy          Social History     Substance and Sexual Activity   Alcohol Use Yes    Alcohol/week: 2 0 standard drinks    Types: 2 Cans of beer per week    Comment: socially     Social History     Tobacco Use   Smoking Status Former Smoker    Types: Cigarettes    Quit date: 5    Years since quittin 0   Smokeless Tobacco Never Used     Social History     Substance and Sexual Activity   Drug Use Never       Family History:  Family History   Problem Relation Age of Onset    Cancer Mother     Kidney disease Mother     Diabetes Father     Heart disease Father     Cancer Sister        Physical Exam:     Vitals:   Blood Pressure: 131/71 (22 1330)  Pulse: 83 (22 1330)  Temperature: 100 3 °F (37 9 °C) (22 1330)  Temp Source: Tympanic (22 1330)  Respirations: 22 (22 1300)  Height: 5' 1" (154 9 cm) (22 1118)  Weight - Scale: 72 6 kg (160 lb) (22 1118)  SpO2: 93 % (22 1330)    Physical Exam  HENT:      Head: Normocephalic and atraumatic  Mouth/Throat:      Mouth: Mucous membranes are moist    Eyes:      Extraocular Movements: Extraocular movements intact  Cardiovascular:      Rate and Rhythm: Normal rate and regular rhythm  Pulmonary:      Effort: Pulmonary effort is normal       Comments: Decreased breath sounds  Abdominal:      General: Abdomen is flat  Bowel sounds are normal       Palpations: Abdomen is soft  Tenderness: There is no abdominal tenderness  Musculoskeletal:      Right lower leg: No edema  Left lower leg: No edema  Skin:     General: Skin is warm and dry  Neurological:      Mental Status: She is alert  Mental status is at baseline            Additional Data:     Lab Results:  Results from last 7 days   Lab Units 22  1227   WBC Thousand/uL 7 18   HEMOGLOBIN g/dL 12 1   HEMATOCRIT % 37 3 PLATELETS Thousands/uL 193   NEUTROS PCT % 78*   LYMPHS PCT % 15   MONOS PCT % 6   EOS PCT % 0     Results from last 7 days   Lab Units 01/09/22  1227   SODIUM mmol/L 137   POTASSIUM mmol/L 3 7   CHLORIDE mmol/L 101   CO2 mmol/L 27   BUN mg/dL 16   CREATININE mg/dL 0 84   ANION GAP mmol/L 9   CALCIUM mg/dL 8 2*   ALBUMIN g/dL 3 3*   TOTAL BILIRUBIN mg/dL 0 67   ALK PHOS U/L 82   ALT U/L 33   AST U/L 25   GLUCOSE RANDOM mg/dL 107                       Imaging: Personally reviewed the following imaging: chest xray  XR chest 1 view portable    (Results Pending)       EKG and Other Studies Reviewed on Admission:   · EKG: NSR  HR 90     ** Please Note: This note has been constructed using a voice recognition system   **

## 2022-01-09 NOTE — ASSESSMENT & PLAN NOTE
Initiate COVID protocol with IV steroids and remdesivir  Anticoagulation  Supplemental oxygen  Incentive spirometer and encourage proning  Trend inflammatory markers

## 2022-01-10 LAB
ALBUMIN SERPL BCP-MCNC: 2.8 G/DL (ref 3.5–5)
ALP SERPL-CCNC: 68 U/L (ref 46–116)
ALT SERPL W P-5'-P-CCNC: 25 U/L (ref 12–78)
ANION GAP SERPL CALCULATED.3IONS-SCNC: 9 MMOL/L (ref 4–13)
AST SERPL W P-5'-P-CCNC: 24 U/L (ref 5–45)
ATRIAL RATE: 90 BPM
BASOPHILS # BLD AUTO: 0 THOUSANDS/ΜL (ref 0–0.1)
BASOPHILS NFR BLD AUTO: 0 % (ref 0–1)
BILIRUB SERPL-MCNC: 0.5 MG/DL (ref 0.2–1)
BUN SERPL-MCNC: 14 MG/DL (ref 5–25)
CALCIUM ALBUM COR SERPL-MCNC: 8.8 MG/DL (ref 8.3–10.1)
CALCIUM SERPL-MCNC: 7.8 MG/DL (ref 8.3–10.1)
CHLORIDE SERPL-SCNC: 106 MMOL/L (ref 100–108)
CO2 SERPL-SCNC: 25 MMOL/L (ref 21–32)
CREAT SERPL-MCNC: 0.59 MG/DL (ref 0.6–1.3)
EOSINOPHIL # BLD AUTO: 0 THOUSAND/ΜL (ref 0–0.61)
EOSINOPHIL NFR BLD AUTO: 0 % (ref 0–6)
ERYTHROCYTE [DISTWIDTH] IN BLOOD BY AUTOMATED COUNT: 12.7 % (ref 11.6–15.1)
GFR SERPL CREATININE-BSD FRML MDRD: 97 ML/MIN/1.73SQ M
GLUCOSE SERPL-MCNC: 93 MG/DL (ref 65–140)
HCT VFR BLD AUTO: 34.2 % (ref 34.8–46.1)
HGB BLD-MCNC: 11 G/DL (ref 11.5–15.4)
IMM GRANULOCYTES # BLD AUTO: 0.02 THOUSAND/UL (ref 0–0.2)
IMM GRANULOCYTES NFR BLD AUTO: 1 % (ref 0–2)
LYMPHOCYTES # BLD AUTO: 1.08 THOUSANDS/ΜL (ref 0.6–4.47)
LYMPHOCYTES NFR BLD AUTO: 26 % (ref 14–44)
MCH RBC QN AUTO: 28.5 PG (ref 26.8–34.3)
MCHC RBC AUTO-ENTMCNC: 32.2 G/DL (ref 31.4–37.4)
MCV RBC AUTO: 89 FL (ref 82–98)
MONOCYTES # BLD AUTO: 0.42 THOUSAND/ΜL (ref 0.17–1.22)
MONOCYTES NFR BLD AUTO: 10 % (ref 4–12)
NEUTROPHILS # BLD AUTO: 2.68 THOUSANDS/ΜL (ref 1.85–7.62)
NEUTS SEG NFR BLD AUTO: 63 % (ref 43–75)
NRBC BLD AUTO-RTO: 0 /100 WBCS
P AXIS: 51 DEGREES
PLATELET # BLD AUTO: 165 THOUSANDS/UL (ref 149–390)
PMV BLD AUTO: 11.3 FL (ref 8.9–12.7)
POTASSIUM SERPL-SCNC: 3.5 MMOL/L (ref 3.5–5.3)
PR INTERVAL: 152 MS
PROT SERPL-MCNC: 5.9 G/DL (ref 6.4–8.2)
QRS AXIS: 48 DEGREES
QRSD INTERVAL: 88 MS
QT INTERVAL: 334 MS
QTC INTERVAL: 408 MS
RBC # BLD AUTO: 3.86 MILLION/UL (ref 3.81–5.12)
SODIUM SERPL-SCNC: 140 MMOL/L (ref 136–145)
T WAVE AXIS: 39 DEGREES
VENTRICULAR RATE: 90 BPM
WBC # BLD AUTO: 4.2 THOUSAND/UL (ref 4.31–10.16)

## 2022-01-10 PROCEDURE — XW033E5 INTRODUCTION OF REMDESIVIR ANTI-INFECTIVE INTO PERIPHERAL VEIN, PERCUTANEOUS APPROACH, NEW TECHNOLOGY GROUP 5: ICD-10-PCS | Performed by: INTERNAL MEDICINE

## 2022-01-10 PROCEDURE — 85025 COMPLETE CBC W/AUTO DIFF WBC: CPT | Performed by: STUDENT IN AN ORGANIZED HEALTH CARE EDUCATION/TRAINING PROGRAM

## 2022-01-10 PROCEDURE — 94760 N-INVAS EAR/PLS OXIMETRY 1: CPT

## 2022-01-10 PROCEDURE — 80053 COMPREHEN METABOLIC PANEL: CPT | Performed by: STUDENT IN AN ORGANIZED HEALTH CARE EDUCATION/TRAINING PROGRAM

## 2022-01-10 PROCEDURE — 99232 SBSQ HOSP IP/OBS MODERATE 35: CPT | Performed by: STUDENT IN AN ORGANIZED HEALTH CARE EDUCATION/TRAINING PROGRAM

## 2022-01-10 PROCEDURE — 93010 ELECTROCARDIOGRAM REPORT: CPT | Performed by: INTERNAL MEDICINE

## 2022-01-10 RX ADMIN — REMDESIVIR 100 MG: 100 INJECTION, POWDER, LYOPHILIZED, FOR SOLUTION INTRAVENOUS at 16:26

## 2022-01-10 RX ADMIN — Medication 250 MG: at 08:30

## 2022-01-10 RX ADMIN — DEXAMETHASONE SODIUM PHOSPHATE 6 MG: 4 INJECTION INTRA-ARTICULAR; INTRALESIONAL; INTRAMUSCULAR; INTRAVENOUS; SOFT TISSUE at 08:31

## 2022-01-10 RX ADMIN — FLUTICASONE FUROATE AND VILANTEROL TRIFENATATE 1 PUFF: 200; 25 POWDER RESPIRATORY (INHALATION) at 08:31

## 2022-01-10 RX ADMIN — Medication 1 TABLET: at 08:31

## 2022-01-10 RX ADMIN — GUAIFENESIN AND DEXTROMETHORPHAN 10 ML: 100; 10 SYRUP ORAL at 20:21

## 2022-01-10 RX ADMIN — LEVOTHYROXINE SODIUM 112 MCG: 112 TABLET ORAL at 05:09

## 2022-01-10 RX ADMIN — BENZONATATE 100 MG: 100 CAPSULE ORAL at 17:36

## 2022-01-10 RX ADMIN — ENOXAPARIN SODIUM 40 MG: 40 INJECTION SUBCUTANEOUS at 08:31

## 2022-01-10 RX ADMIN — GUAIFENESIN AND DEXTROMETHORPHAN 10 ML: 100; 10 SYRUP ORAL at 05:17

## 2022-01-10 RX ADMIN — Medication 250 MG: at 17:36

## 2022-01-10 NOTE — PLAN OF CARE
Problem: Potential for Falls  Goal: Patient will remain free of falls  Description: INTERVENTIONS:  - Educate patient/family on patient safety including physical limitations  - Instruct patient to call for assistance with activity   - Consult OT/PT to assist with strengthening/mobility   - Keep Call bell within reach  - Keep bed low and locked with side rails adjusted as appropriate  - Keep care items and personal belongings within reach  - Initiate and maintain comfort rounds  - Make Fall Risk Sign visible to staff  - Offer Toileting every 2 Hours, in advance of need  - Initiate/Maintain 1alarm  - Obtain necessary fall risk management equipment: call bell use  - Apply yellow socks and bracelet for high fall risk patients  - Consider moving patient to room near nurses station  Outcome: Progressing     Problem: PAIN - ADULT  Goal: Verbalizes/displays adequate comfort level or baseline comfort level  Description: Interventions:  - Encourage patient to monitor pain and request assistance  - Assess pain using appropriate pain scale  - Administer analgesics based on type and severity of pain and evaluate response  - Implement non-pharmacological measures as appropriate and evaluate response  - Consider cultural and social influences on pain and pain management  - Notify physician/advanced practitioner if interventions unsuccessful or patient reports new pain  Outcome: Progressing     Problem: RESPIRATORY - ADULT  Goal: Achieves optimal ventilation and oxygenation  Description: INTERVENTIONS:  - Assess for changes in respiratory status  - Assess for changes in mentation and behavior  - Position to facilitate oxygenation and minimize respiratory effort  - Oxygen administered by appropriate delivery if ordered  - Initiate smoking cessation education as indicated  - Encourage broncho-pulmonary hygiene including cough, deep breathe, Incentive Spirometry  - Assess the need for suctioning and aspirate as needed  - Assess and instruct to report SOB or any respiratory difficulty  - Respiratory Therapy support as indicated  Outcome: Progressing

## 2022-01-10 NOTE — UTILIZATION REVIEW
Initial Clinical Review    Admission: Date/Time/Statement:   Admission Orders (From admission, onward)     Ordered        01/09/22 1257  Inpatient Admission  Once                      Orders Placed This Encounter   Procedures    Inpatient Admission     Standing Status:   Standing     Number of Occurrences:   1     Order Specific Question:   Level of Care     Answer:   Med Surg [16]     Order Specific Question:   Estimated length of stay     Answer:   More than 2 Midnights     Order Specific Question:   Certification     Answer:   I certify that inpatient services are medically necessary for this patient for a duration of greater than two midnights  See H&P and MD Progress Notes for additional information about the patient's course of treatment  ED Arrival Information     Expected Arrival Acuity    - 1/9/2022 10:50 Emergent         Means of arrival Escorted by Service Admission type    [facility] Emergency         Arrival complaint    low oxygen        Initial Presentation:   59 yof to ER from home c/o 11 days ago of fever 100 2, cough, shortness of breath and weakness, now hypoxic 87-88% RA at home, nauseous, poor appetite, diarrhea, febrile @ 103 this morning, feeling syncopal getting OOB today  Dx COVID on December 30th, not vaccinated  Hx asthma and hypothyroidism  Presents febrile, tachypneic, lungs with decreased breath sounds, s/s as above  Admission work-up showing elevated d-dimer & BNP, bilateral pneumonia on imaging  Admitted to inpatient status for acute hypoxic respiratory failure 2nd COVID pneumonia  Started on med/tx pathway, O2 @ 2ltr nc  Date: 1/10/22   Day 2:   Persistent SHOEMAKER 2nd respiratory failure due to COVID pneumonia  O2 requirements @ 2ltr nc  Lungs with diminished breath sounds, NPC  Remains on COVID med/tx protocols/pathway at this time       ED Triage Vitals   Temperature Pulse Respirations Blood Pressure SpO2   01/09/22 1118 01/09/22 1118 01/09/22 1118 01/09/22 1118 01/09/22 1118   99 2 °F (37 3 °C) 84 22 121/64 94 %      Temp Source Heart Rate Source Patient Position - Orthostatic VS BP Location FiO2 (%)   01/09/22 1118 01/09/22 1118 01/09/22 1118 01/09/22 1118 --   Oral Monitor Lying Left arm       Pain Score       01/09/22 1330       5          Wt Readings from Last 1 Encounters:   01/09/22 72 6 kg (160 lb)     Additional Vital Signs:   01/10/22 0841 99 °F (37 2 °C) 74 22 116/61 83 94 % 28 2 L/min Nasal cannula Lying   01/10/22 0700 -- -- -- -- -- 84 % Abnormal  -- -- None (Room air) --   01/10/22 0244 98 7 °F (37 1 °C) 62 18 124/65 -- 94 % 24 1 L/min Nasal cannula Lying   01/09/22 2248 98 4 °F (36 9 °C) 69 18 99/57 -- 93 % -- -- None (Room air) Lying   01/09/22 2040 98 8 °F (37 1 °C) 76 18 105/55 -- 93 % 24 1 L/min Nasal cannula Lying   01/09/22 1930 -- 77 -- 116/63 86 95 % -- -- -- --   01/09/22 1830 -- 69 18 103/58 75 97 % 24 1 L/min Nasal cannula Lying   01/09/22 1800 -- 65 20 104/59 77 96 % 24 1 L/min Nasal cannula Lying   01/09/22 1700 -- 74 22 100/59 75 96 % 24 1 L/min Nasal cannula Lying   01/09/22 1630 99 3 °F (37 4 °C) 77 22 94/58 71 95 % -- -- None (Room air) Lying   01/09/22 1600 -- 85 22 102/55 72 96 % 24 1 L/min Nasal cannula Lying   01/09/22 1500 -- 79 22 117/61 84 100 % 28 2 L/min Nasal cannula Lying   01/09/22 1400 -- 83 22 131/71 93 95 % 28 2 L/min Nasal cannula Lying     Pertinent Labs/Diagnostic Test Results:   Results from last 7 days   Lab Units 01/10/22  0514 01/09/22  1227 01/05/22  2302   WBC Thousand/uL 4 20* 7 18 3 58*   HEMOGLOBIN g/dL 11 0* 12 1 13 7   HEMATOCRIT % 34 2* 37 3 41 4   PLATELETS Thousands/uL 165 193 139*   NEUTROS ABS Thousands/µL 2 68 5 62 1 93     Results from last 7 days   Lab Units 01/10/22  0514 01/09/22  1227 01/05/22  2302   SODIUM mmol/L 140 137 135*   POTASSIUM mmol/L 3 5 3 7 3 3*   CHLORIDE mmol/L 106 101 100   CO2 mmol/L 25 27 28   ANION GAP mmol/L 9 9 7   BUN mg/dL 14 16 10   CREATININE mg/dL 0 59* 0 84 0 91   EGFR ml/min/1 73sq m 97 74 67   CALCIUM mg/dL 7 8* 8 2* 8 2*     Results from last 7 days   Lab Units 01/10/22  0514 01/09/22  1227 01/05/22  2302   AST U/L 24 25 21   ALT U/L 25 33 39   ALK PHOS U/L 68 82 96   TOTAL PROTEIN g/dL 5 9* 6 7 7 1   ALBUMIN g/dL 2 8* 3 3* 3 7   TOTAL BILIRUBIN mg/dL 0 50 0 67 0 54     Results from last 7 days   Lab Units 01/10/22  0514 01/09/22  1227 01/05/22  2302   GLUCOSE RANDOM mg/dL 93 107 133     Results from last 7 days   Lab Units 01/09/22  1227   CK TOTAL U/L 142   CK MB INDEX % <1 0   CK MB ng/mL <1 0     Results from last 7 days   Lab Units 01/09/22  1227 01/05/22  2302   HS TNI 0HR ng/L 4 3     Results from last 7 days   Lab Units 01/09/22  1227   D-DIMER QUANTITATIVE ug/ml FEU 0 54*     Results from last 7 days   Lab Units 01/09/22  1227 01/05/22  2302   NT-PRO BNP pg/mL 281* 10     Results from last 7 days   Lab Units 01/09/22  1227   CRP mg/L 30 1*     1/9  Cxr=  Diffuse bilateral pneumonia compatible with confirmed COVID 19 infection    Ekg=  Rhythm: sinus rhythm    QRS:     QRS axis:  Normal  Conduction:     Conduction: normal    ST segments:     ST segments:  Normal  T waves:     T waves: normal      ED Treatment:   Medication Administration from 01/09/2022 1050 to 01/09/2022 2021       Date/Time Order Dose Route Action     01/09/2022 1228 dexamethasone (DECADRON) injection 6 mg 6 mg Intravenous Given     01/09/2022 1345 ondansetron (ZOFRAN) injection 4 mg 4 mg Intravenous Given     01/09/2022 1344 sodium chloride 0 9 % bolus 1,000 mL 1,000 mL Intravenous New Bag     01/09/2022 1513 fluticasone-vilanterol (BREO ELLIPTA) 200-25 MCG/INH inhaler 1 puff 1 puff Inhalation Given     01/09/2022 1712 saccharomyces boulardii (FLORASTOR) capsule 250 mg 250 mg Oral Given     01/09/2022 1513 acetaminophen (TYLENOL) tablet 650 mg 650 mg Oral Given     01/09/2022 1513 enoxaparin (LOVENOX) subcutaneous injection 40 mg 40 mg Subcutaneous Given     01/09/2022 1514 remdesivir (Veklury) 200 mg in sodium chloride 0 9 % 290 mL IVPB 200 mg Intravenous Given        Past Medical History:   Diagnosis Date    Asthma     Disease of thyroid gland     Diverticulitis     Sleep apnea      Present on Admission:   Hypothyroid   Asthma   Acute respiratory failure with hypoxia (HCC)   COVID-19    Admitting Diagnosis: Shortness of breath [R06 02]  COVID [U07 1]  Age/Sex: 59 y o  female  Admission Orders:  Cont pulse ox  Scd/foot pumps  O2 to keep sats>90%  Contact & airborne isolation    Scheduled Medications:  dexamethasone, 6 mg, Intravenous, Q24H  enoxaparin, 40 mg, Subcutaneous, Daily  fluticasone-vilanterol, 1 puff, Inhalation, Daily  levothyroxine, 112 mcg, Oral, Once per day on Mon Tue Wed Thu Fri  [START ON 1/15/2022] levothyroxine, 125 mcg, Oral, Once per day on Sun Sat  multivitamin-minerals, 1 tablet, Oral, Daily  remdesivir, 100 mg, Intravenous, Q24H  saccharomyces boulardii, 250 mg, Oral, BID    PRN Meds:  acetaminophen, 650 mg, Oral, Q6H PRN  albuterol, 2 5 mg, Nebulization, Q6H PRN  benzonatate, 100 mg, Oral, TID PRN  dextromethorphan-guaiFENesin, 10 mL, Oral, Q4H PRN  ondansetron, 4 mg, Intravenous, Q6H PRN    Network Utilization Review Department  ATTENTION: Please call with any questions or concerns to [phone] and carefully listen to the prompts so that you are directed to the right person  All voicemails are confidential   Pamela Bush all requests for admission clinical reviews, approved or denied determinations and any other requests to dedicated fax number below belonging to the campus where the patient is receiving treatment   List of dedicated fax numbers for the Facilities:  [address] DENIALS (Administrative/Medical Necessity) [phone]   [address] [place] [address] (Maternity/NICU/Pediatrics) 261 [facility],[address] Floor [place] [address] [phone] 8049 [facility] [phone] [address] [facility] [place] [address] [address] [place] [address] Darryn Broussard 1481 [address] [place], [place] [address] [phone]

## 2022-01-10 NOTE — PROGRESS NOTES
Sudarshan 43  Progress Note [place] Im 1960, 59 y o  female MRN: [id number]  Unit/Bed#: [address] [place] [address] Encounter: [id number]  Primary Care Provider: Lydia Cummings MD   Date and time admitted to hospital: 2022 11:18 AM    * Acute respiratory failure with hypoxia [facility])  Assessment & Plan  Likely due to COVID-19 in setting of history of asthma    COVID-19  Assessment & Plan  Initiate COVID protocol with IV steroids and remdesivir  Anticoagulation  Supplemental oxygen  Incentive spirometer and encourage proning  Trend inflammatory markers    BMI 30 0-30 9,adult  Assessment & Plan  Lifestyle modifications  Outpatient follow-up    Asthma  Assessment & Plan  Continue Breo and p r n  Nebulizer    Hypothyroid  Assessment & Plan  Continue home Synthroid      VTE Pharmacologic Prophylaxis:   Moderate Risk (Score 3-4) - Pharmacological DVT Prophylaxis Ordered: enoxaparin (Lovenox)  Patient Centered Rounds: I performed bedside rounds with nursing staff today  Discussions with Specialists or Other Care Team Provider:  Case management    Education and Discussions with Family / Patient: Updated  () via phone  Time Spent for Care: 45 minutes  More than 50% of total time spent on counseling and coordination of care as described above  Current Length of Stay: 1 day(s)  Current Patient Status: Inpatient   Certification Statement: The patient will continue to require additional inpatient hospital stay due to [facility]  Discharge Plan: Anticipate discharge in 24-48 hrs to home  Code Status: Level 1 - Full Code    Subjective:   No acute events overnight  Patient reports feeling better      Objective:     Vitals:   Temp (24hrs), Av 7 °F (37 1 °C), Min:98 4 °F (36 9 °C), Max:99 °F (37 2 °C)    Temp:  [98 4 °F (36 9 °C)-99 °F (37 2 °C)] 98 8 °F (37 1 °C)  HR:  [62-86] 86  Resp:  [18-22] 20  BP: ()/(55-73) 106/63  SpO2:  [84 %-95 %] 94 %  Body mass index is 30 23 kg/m²  Input and Output Summary (last 24 hours):   No intake or output data in the 24 hours ending 01/10/22 1834    Physical Exam:   Physical Exam  HENT:      Head: Normocephalic and atraumatic  Mouth/Throat:      Mouth: Mucous membranes are moist    Eyes:      Extraocular Movements: Extraocular movements intact  Cardiovascular:      Rate and Rhythm: Normal rate and regular rhythm  Pulmonary:      Effort: Pulmonary effort is normal       Comments: Decreased breath sounds  Abdominal:      General: Abdomen is flat  Bowel sounds are normal       Palpations: Abdomen is soft  Tenderness: There is no abdominal tenderness  Musculoskeletal:      Right lower leg: No edema  Left lower leg: No edema  Skin:     General: Skin is warm and dry  Neurological:      Mental Status: She is alert  Mental status is at baseline  Additional Data:     Labs:  Results from last 7 days   Lab Units 01/10/22  0514   WBC Thousand/uL 4 20*   HEMOGLOBIN g/dL 11 0*   HEMATOCRIT % 34 2*   PLATELETS Thousands/uL 165   NEUTROS PCT % 63   LYMPHS PCT % 26   MONOS PCT % 10   EOS PCT % 0     Results from last 7 days   Lab Units 01/10/22  0514   SODIUM mmol/L 140   POTASSIUM mmol/L 3 5   CHLORIDE mmol/L 106   CO2 mmol/L 25   BUN mg/dL 14   CREATININE mg/dL 0 59*   ANION GAP mmol/L 9   CALCIUM mg/dL 7 8*   ALBUMIN g/dL 2 8*   TOTAL BILIRUBIN mg/dL 0 50   ALK PHOS U/L 68   ALT U/L 25   AST U/L 24   GLUCOSE RANDOM mg/dL 93                       Lines/Drains:  Invasive Devices  Report    Peripheral Intravenous Line            Peripheral IV 01/09/22 Dorsal (posterior); Proximal;Right Forearm 1 day                      Imaging: Reviewed radiology reports from this admission including: chest xray    Recent Cultures (last 7 days):         Last 24 Hours Medication List:   Current Facility-Administered Medications   Medication Dose Route Frequency Provider Last Rate    acetaminophen  650 mg Oral Q6H PRN Agape [place] Hussein, DO      albuterol  2 5 mg Nebulization Q6H PRN Damian Gomes, DO      benzonatate  100 mg Oral TID PRN Yong Rowe MD      dexamethasone  6 mg Intravenous Q24H Damian Gomes, DO      dextromethorphan-guaiFENesin  10 mL Oral Q4H PRN Yong Rowe MD      enoxaparin  40 mg Subcutaneous Daily Damian Gomes DO      fluticasone-vilanterol  1 puff Inhalation Daily Damian Gomes DO      levothyroxine  112 mcg Oral Once per day on Mon Tue Wed Thu Fri Damian Gomes DO      [START ON 1/15/2022] levothyroxine  125 mcg Oral Once per day on Sun Sat Lethape Vertis Staff, DO      multivitamin-minerals  1 tablet Oral Daily Damian Gomes DO      ondansetron  4 mg Intravenous Q6H PRN Damian Vertis Staff, DO      remdesivir  100 mg Intravenous Q24H Damian Gomes DO      saccharomyces boulardii  250 mg Oral BID Lethape Vertis Staff, DO          Today, Patient Was Seen By: Dory Stewart DO    **Please Note: This note may have been constructed using a voice recognition system  **

## 2022-01-11 LAB
ALBUMIN SERPL BCP-MCNC: 2.9 G/DL (ref 3.5–5)
ALP SERPL-CCNC: 71 U/L (ref 46–116)
ALT SERPL W P-5'-P-CCNC: 25 U/L (ref 12–78)
ANION GAP SERPL CALCULATED.3IONS-SCNC: 10 MMOL/L (ref 4–13)
AST SERPL W P-5'-P-CCNC: 22 U/L (ref 5–45)
BASOPHILS # BLD AUTO: 0.01 THOUSANDS/ΜL (ref 0–0.1)
BASOPHILS NFR BLD AUTO: 0 % (ref 0–1)
BILIRUB SERPL-MCNC: 0.52 MG/DL (ref 0.2–1)
BUN SERPL-MCNC: 14 MG/DL (ref 5–25)
CALCIUM ALBUM COR SERPL-MCNC: 8.9 MG/DL (ref 8.3–10.1)
CALCIUM SERPL-MCNC: 8 MG/DL (ref 8.3–10.1)
CHLORIDE SERPL-SCNC: 103 MMOL/L (ref 100–108)
CO2 SERPL-SCNC: 25 MMOL/L (ref 21–32)
CREAT SERPL-MCNC: 0.63 MG/DL (ref 0.6–1.3)
CRP SERPL QL: 33.6 MG/L
D DIMER PPP FEU-MCNC: 0.41 UG/ML FEU
EOSINOPHIL # BLD AUTO: 0 THOUSAND/ΜL (ref 0–0.61)
EOSINOPHIL NFR BLD AUTO: 0 % (ref 0–6)
ERYTHROCYTE [DISTWIDTH] IN BLOOD BY AUTOMATED COUNT: 12.4 % (ref 11.6–15.1)
GFR SERPL CREATININE-BSD FRML MDRD: 95 ML/MIN/1.73SQ M
GLUCOSE SERPL-MCNC: 82 MG/DL (ref 65–140)
HCT VFR BLD AUTO: 36.7 % (ref 34.8–46.1)
HGB BLD-MCNC: 11.5 G/DL (ref 11.5–15.4)
IMM GRANULOCYTES # BLD AUTO: 0.05 THOUSAND/UL (ref 0–0.2)
IMM GRANULOCYTES NFR BLD AUTO: 1 % (ref 0–2)
LYMPHOCYTES # BLD AUTO: 1.3 THOUSANDS/ΜL (ref 0.6–4.47)
LYMPHOCYTES NFR BLD AUTO: 24 % (ref 14–44)
MCH RBC QN AUTO: 28.3 PG (ref 26.8–34.3)
MCHC RBC AUTO-ENTMCNC: 31.3 G/DL (ref 31.4–37.4)
MCV RBC AUTO: 90 FL (ref 82–98)
MONOCYTES # BLD AUTO: 0.47 THOUSAND/ΜL (ref 0.17–1.22)
MONOCYTES NFR BLD AUTO: 9 % (ref 4–12)
NEUTROPHILS # BLD AUTO: 3.64 THOUSANDS/ΜL (ref 1.85–7.62)
NEUTS SEG NFR BLD AUTO: 66 % (ref 43–75)
NRBC BLD AUTO-RTO: 0 /100 WBCS
PLATELET # BLD AUTO: 198 THOUSANDS/UL (ref 149–390)
PMV BLD AUTO: 11.2 FL (ref 8.9–12.7)
POTASSIUM SERPL-SCNC: 3.2 MMOL/L (ref 3.5–5.3)
PROCALCITONIN SERPL-MCNC: <0.05 NG/ML
PROT SERPL-MCNC: 6.1 G/DL (ref 6.4–8.2)
RBC # BLD AUTO: 4.07 MILLION/UL (ref 3.81–5.12)
SODIUM SERPL-SCNC: 138 MMOL/L (ref 136–145)
WBC # BLD AUTO: 5.47 THOUSAND/UL (ref 4.31–10.16)

## 2022-01-11 PROCEDURE — 85379 FIBRIN DEGRADATION QUANT: CPT | Performed by: INTERNAL MEDICINE

## 2022-01-11 PROCEDURE — 97162 PT EVAL MOD COMPLEX 30 MIN: CPT

## 2022-01-11 PROCEDURE — 84145 PROCALCITONIN (PCT): CPT | Performed by: INTERNAL MEDICINE

## 2022-01-11 PROCEDURE — 80053 COMPREHEN METABOLIC PANEL: CPT | Performed by: INTERNAL MEDICINE

## 2022-01-11 PROCEDURE — 85025 COMPLETE CBC W/AUTO DIFF WBC: CPT | Performed by: INTERNAL MEDICINE

## 2022-01-11 PROCEDURE — 86140 C-REACTIVE PROTEIN: CPT | Performed by: INTERNAL MEDICINE

## 2022-01-11 PROCEDURE — 99232 SBSQ HOSP IP/OBS MODERATE 35: CPT | Performed by: INTERNAL MEDICINE

## 2022-01-11 PROCEDURE — 97530 THERAPEUTIC ACTIVITIES: CPT

## 2022-01-11 RX ORDER — POTASSIUM CHLORIDE 20 MEQ/1
20 TABLET, EXTENDED RELEASE ORAL 2 TIMES DAILY
Status: COMPLETED | OUTPATIENT
Start: 2022-01-11 | End: 2022-01-11

## 2022-01-11 RX ORDER — ALBUTEROL SULFATE 2.5 MG/3ML
2.5 SOLUTION RESPIRATORY (INHALATION)
Status: DISCONTINUED | OUTPATIENT
Start: 2022-01-11 | End: 2022-01-11

## 2022-01-11 RX ORDER — BENZONATATE 100 MG/1
100 CAPSULE ORAL 3 TIMES DAILY
Status: DISCONTINUED | OUTPATIENT
Start: 2022-01-11 | End: 2022-01-13 | Stop reason: HOSPADM

## 2022-01-11 RX ADMIN — BENZONATATE 100 MG: 100 CAPSULE ORAL at 09:10

## 2022-01-11 RX ADMIN — POTASSIUM CHLORIDE 20 MEQ: 1500 TABLET, EXTENDED RELEASE ORAL at 17:35

## 2022-01-11 RX ADMIN — BENZONATATE 100 MG: 100 CAPSULE ORAL at 17:35

## 2022-01-11 RX ADMIN — Medication 1 TABLET: at 08:43

## 2022-01-11 RX ADMIN — POTASSIUM CHLORIDE 20 MEQ: 1500 TABLET, EXTENDED RELEASE ORAL at 09:45

## 2022-01-11 RX ADMIN — ENOXAPARIN SODIUM 40 MG: 40 INJECTION SUBCUTANEOUS at 08:43

## 2022-01-11 RX ADMIN — FLUTICASONE FUROATE AND VILANTEROL TRIFENATATE 1 PUFF: 200; 25 POWDER RESPIRATORY (INHALATION) at 08:43

## 2022-01-11 RX ADMIN — Medication 250 MG: at 17:35

## 2022-01-11 RX ADMIN — LEVOTHYROXINE SODIUM 112 MCG: 112 TABLET ORAL at 05:32

## 2022-01-11 RX ADMIN — BENZONATATE 100 MG: 100 CAPSULE ORAL at 21:50

## 2022-01-11 RX ADMIN — REMDESIVIR 100 MG: 100 INJECTION, POWDER, LYOPHILIZED, FOR SOLUTION INTRAVENOUS at 17:36

## 2022-01-11 RX ADMIN — Medication 250 MG: at 08:43

## 2022-01-11 RX ADMIN — DEXAMETHASONE SODIUM PHOSPHATE 6 MG: 4 INJECTION INTRA-ARTICULAR; INTRALESIONAL; INTRAMUSCULAR; INTRAVENOUS; SOFT TISSUE at 08:43

## 2022-01-11 NOTE — PROGRESS NOTES
[address] Internal Medicine Progress Note  Patient: Allison Kline 59 y o  female   MRN: [id number]  PCP: Sharla Claire MD  Unit/Bed#: [address] [place] [address] Encounter: [id number]  Date Of Visit: 01/12/22    Problem List:    Principal Problem:    Acute respiratory failure with hypoxia ([address] )  Active Problems:    Pneumonia due to COVID-19 virus    Asthma    Hypothyroid    BMI 30 0-30 9,adult      Assessment & Plan:    Pneumonia due to COVID-19 virus  Assessment & Plan    CXR - consistent with COVID pneumonia  SARS-CoV-2 PCR positive-12/30  Reports ongoing cough and shortness of breath  · Medication treatment started per COVID protocol:  · Dexamethasone 6 mg IV daily for 10 days  · Remdesivir 200 mg x 1 followed by 100 mg daily for 4 days  · Anticoagulation-Lovenox 30 mg q 12 hours  · Will add bronchodilators, continue Breo  · Symptomatic treatment for cough  · Trend inflammatory markers, CRP  ·   · Trend D-dimer  · Troponin, proBNP   · Daily CBC, CMP  · Trend procalcitonin  · Patient was educated and encouraged self proing  · Increase activity and mobilization as tolerated  · PT/OT as needed          Results from last 7 days   Lab Units 01/11/22  0557 01/09/22  1227   CRP mg/L 33 6* 30 1*   D-DIMER QUANTITATIVE ug/ml FEU 0 41 0 54*      Results from last 7 days   Lab Units 01/11/22  0557   PROCALCITONIN ng/ml <0 05            * Acute respiratory failure with hypoxia [facility])  Assessment & Plan  Secondary to COVID-19 in setting of history of asthma  Bronchodilators, steroid  Continue supplemental oxygen    Asthma  Assessment & Plan  Continue Breo and p r n  Nebulizer    BMI 30 0-30 9,adult  Assessment & Plan  Lifestyle modifications  Outpatient follow-up    Hypothyroid  Assessment & Plan  Continue home Synthroid      Hypokalemia-replete    VTE Pharmacologic Prophylaxis:   Moderate Risk (Score 3-4) - Pharmacological DVT Prophylaxis Ordered: enoxaparin (Lovenox)      Patient Centered Rounds: I performed bedside rounds with nursing staff today  Discussions with Specialists or Other Care Team Provider:  Yes    Education and Discussions with Family / Patient: Updated  () via phone  Time Spent for Care: 45 minutes  More than 50% of total time spent on counseling and coordination of care as described above  Current Length of Stay: 3 day(s)  Current Patient Status: Inpatient   Certification Statement: The patient will continue to require additional inpatient hospital stay due to [place] hypoxia  Discharge Plan: Anticipate discharge in 24-48 hrs to home  Code Status: Level 1 - Full Code    Subjective:   Reports shortness of breath and cough  Denies any chest pain      Objective:     Vitals:   Temp (24hrs), Av 9 °F (37 2 °C), Min:98 2 °F (36 8 °C), Max:100 °F (37 8 °C)    Temp:  [98 2 °F (36 8 °C)-100 °F (37 8 °C)] 98 2 °F (36 8 °C)  HR:  [60-84] 60  Resp:  [18-31] 18  BP: (102-133)/(58-67) 102/58  SpO2:  [91 %-96 %] 96 % on room air at rest but hypoxia with activity  Body mass index is 30 23 kg/m²  Input and Output Summary (last 24 hours):   No intake or output data in the 24 hours ending 22 0045    Physical Exam:   Physical Exam  Constitutional:       General: She is not in acute distress  HENT:      Head: Normocephalic and atraumatic  Cardiovascular:      Rate and Rhythm: Normal rate  Pulmonary:      Effort: Pulmonary effort is normal  No respiratory distress  Breath sounds: Normal breath sounds  No wheezing or rales  Comments: Diminished, clear  Decreased air entry due to persistent cough  Abdominal:      General: Bowel sounds are normal  There is no distension  Palpations: Abdomen is soft  Tenderness: There is no abdominal tenderness  There is no guarding or rebound  Musculoskeletal:      Right lower leg: No edema  Left lower leg: No edema  Skin:     General: Skin is warm and dry  Findings: No rash  Neurological:      General: No focal deficit present  Mental Status: She is alert and oriented to person, place, and time  Mental status is at baseline  Additional Data:     Labs:  Results from last 7 days   Lab Units 01/11/22  0557   WBC Thousand/uL 5 47   HEMOGLOBIN g/dL 11 5   HEMATOCRIT % 36 7   PLATELETS Thousands/uL 198   NEUTROS PCT % 66   LYMPHS PCT % 24   MONOS PCT % 9   EOS PCT % 0     Results from last 7 days   Lab Units 01/11/22  0557   SODIUM mmol/L 138   POTASSIUM mmol/L 3 2*   CHLORIDE mmol/L 103   CO2 mmol/L 25   BUN mg/dL 14   CREATININE mg/dL 0 63   ANION GAP mmol/L 10   CALCIUM mg/dL 8 0*   ALBUMIN g/dL 2 9*   TOTAL BILIRUBIN mg/dL 0 52   ALK PHOS U/L 71   ALT U/L 25   AST U/L 22   GLUCOSE RANDOM mg/dL 82                 Results from last 7 days   Lab Units 01/11/22  0557   PROCALCITONIN ng/ml <0 05       Lines/Drains:  Invasive Devices  Report    Peripheral Intravenous Line            Peripheral IV 01/09/22 Dorsal (posterior); Proximal;Right Forearm 2 days                      Imaging: Reviewed radiology reports from this admission including: chest xray    Recent Cultures (last 7 days):         Last 24 Hours Medication List:   Current Facility-Administered Medications   Medication Dose Route Frequency Provider Last Rate    acetaminophen  650 mg Oral Q6H PRN Damian Gomes DO      albuterol  2 5 mg Nebulization Q6H PRN Damian Gomes DO      benzonatate  100 mg Oral TID Elna Fothergill, MD      dexamethasone  6 mg Intravenous Q24H Damain Gomes DO      dextromethorphan-guaiFENesin  10 mL Oral Q4H PRN Jeanette Anne MD      enoxaparin  40 mg Subcutaneous Daily Damian Gomes DO      fluticasone-vilanterol  1 puff Inhalation Daily Damian Gomes DO      levothyroxine  112 mcg Oral Once per day on Mon Tue Wed Thu Fri Damian Gomes DO      [START ON 1/15/2022] levothyroxine  125 mcg Oral Once per day on Sun Sat Damian Titus DO      multivitamin-minerals  1 tablet Oral Daily Damian Gomes DO      ondansetron  4 mg Intravenous Q6H PRN Agape L DO Mireya      remdesivir  100 mg Intravenous Q24H Damian Gomes DO      saccharomyces boulardii  250 mg Oral BID Damian Price DO          Today, Patient Was Seen By: Alana Alfonso MD    ** Please Note: "This note has been constructed using a voice recognition system  Therefore there may be syntax, spelling, and/or grammatical errors   Please call if you have any questions  "**

## 2022-01-11 NOTE — PLAN OF CARE
Problem: PAIN - ADULT  Goal: Verbalizes/displays adequate comfort level or baseline comfort level  Description: Interventions:  - Encourage patient to monitor pain and request assistance  - Assess pain using appropriate pain scale  - Administer analgesics based on type and severity of pain and evaluate response  - Implement non-pharmacological measures as appropriate and evaluate response  - Consider cultural and social influences on pain and pain management  - Notify physician/advanced practitioner if interventions unsuccessful or patient reports new pain  Outcome: Progressing     Problem: RESPIRATORY - ADULT  Goal: Achieves optimal ventilation and oxygenation  Description: INTERVENTIONS:  - Assess for changes in respiratory status  - Assess for changes in mentation and behavior  - Position to facilitate oxygenation and minimize respiratory effort  - Oxygen administered by appropriate delivery if ordered  - Initiate smoking cessation education as indicated  - Encourage broncho-pulmonary hygiene including cough, deep breathe, Incentive Spirometry  - Assess the need for suctioning and aspirate as needed  - Assess and instruct to report SOB or any respiratory difficulty  - Respiratory Therapy support as indicated  Outcome: Progressing Spontaneous, unlabored and symmetrical

## 2022-01-11 NOTE — OCCUPATIONAL THERAPY NOTE
Occupational Therapy Screen       01/11/22 1441   Note Type   Note type Screen   Additional Comments Patient currently independent ADLs/mobility, no skilled inpatient OT services indicated at this time   [facility] Insurance Number  Yaa Bradley, OTR/L [id number]

## 2022-01-11 NOTE — ASSESSMENT & PLAN NOTE
Secondary to COVID-19 in setting of history of asthma  Improved  Home oxygen evaluation was done on discharge, does not require supplemental oxygen at rest and with activity

## 2022-01-11 NOTE — ASSESSMENT & PLAN NOTE
Presented with worsening of shortness of breath and cough associated with nausea and loose bowel movement after COVID diagnosis on 12/30  CXR - consistent with COVID pneumonia  SARS-CoV-2 PCR positive-12/30  Treated as per COVID, mild protocol  Significant improvement with treatment  Respiratory symptoms significantly better  Does not require supplemental oxygen  Nausea is improving, tolerating diet  Denies any other GI symptoms  No further diarrhea  · Medication treatment as per COVID protocol:  · Received Dexamethasone 6 mg IV daily, during hospitalization will transition to prednisone taper on discharge  · Status post Remdesivir 200 mg x 1 followed by 100 mg daily for 4 days  · Anticoagulation-maintained on DVT prophylaxis, D-dimer level remain low  · Continue bronchodilators on discharge  · Continue Symptomatic treatment for cough  · Recommend to monitor respiratory symptoms, patient will follow-up with primary pulmonary after discharge  ·  inflammatory markers, CRP  · Trended,  ·  D-dimer-normal  · Troponin is negative  ·  procalcitonin - negative  · Recommended to continue incentive spirometry, increase activity as tolerated  · Close follow-up with PCP and Pulmonary,  · Discharge and follow-up after COVID pneumonia was recommended          Results from last 7 days   Lab Units 01/13/22  0615 01/12/22  0532 01/11/22  0557 01/09/22  1227   CRP mg/L 21 0* 28 4* 33 6* 30 1*   D-DIMER QUANTITATIVE ug/ml FEU 0 37 0 39 0 41 0 54*      Results from last 7 days   Lab Units 01/12/22  0532 01/11/22  0557   PROCALCITONIN ng/ml <0 05 <0 05

## 2022-01-11 NOTE — ASSESSMENT & PLAN NOTE
With exacerbation in setting of COVID-19 infection  Improving  No wheezing on exam, saturating adequately on room air  Continue bronchodilators, Advair and prednisone taper on discharge  Follow-up with Pulmonary on discharge

## 2022-01-11 NOTE — PHYSICAL THERAPY NOTE
PHYSICAL THERAPY EVALUATION/TREATMENT     01/11/22 1120   PT Last Visit   PT Visit Date 01/11/22   Note Type   Note type Evaluation   Pain Assessment   Pain Assessment Tool 0-10   Pain Score No Pain   Restrictions/Precautions   Weight Bearing Precautions Per Order No   Other Precautions O2   Home Living   Type of [facility] Two level;1/2 bath on main level;Bed/bath upstairs;Stairs to enter with rails  (3 ALECIA)   Bathroom Shower/Tub Tub/shower unit  (and walk in shower)   Additional Comments No DME , no GB, no shower chair   Prior Function   Level of [place] Independent with ADLs and functional mobility   Lives With Spouse   Receives Help From Family   ADL Assistance Independent   IADLs Independent   Vocational Retired   General   Additional Pertinent History Admitted with SOB; + Covid   Family/Caregiver Present No   Cognition   Overall Cognitive Status WFL   Arousal/Participation Cooperative   Orientation Level Oriented X4   Following Commands Follows all commands and directions without difficulty   Subjective   Subjective " I was walking 6 miles a day before this"  Pt reports that she had just "washed up by herself " in the bathroom prior to PT entering room   RLE Assessment   RLE Assessment WFL  (strength WFLs)   LLE Assessment   LLE Assessment WFL  (strength WFLs)   Coordination   Movements are Fluid and Coordinated 1   Bed Mobility   Supine to Sit 7  Independent   Sit to Supine 7  Independent   Transfers   Sit to Stand 5  Supervision   Stand to Sit 5  Supervision   Stand pivot 5  Supervision   Ambulation/Elevation   Gait pattern   (mildly unsteady at times, but no LOB)   Gait Assistance 5  Supervision   Assistive Device None   Distance 10 feet with change in direction   Balance   Static Sitting Good   Dynamic Sitting Good   Static Standing Fair +   Dynamic Standing Fair   Ambulatory Fair   Activity Tolerance   Activity Tolerance Patient tolerated treatment well   Nurse Made Aware yes: Saba Noriega , pt is independent in room , mildly unsteady but does not lose her balance   Assessment   Prognosis Good   Problem List Decreased endurance; Impaired balance;Decreased mobility   Assessment Patient seen for Physical Therapy evaluation  Patient admitted with Acute respiratory failure with hypoxia ([address] )  Comorbidities affecting patient's physical performance include: asthma, sleep apnea, throid disease, left knee surgery (arthorscopic)  Personal factors affecting patient at time of initial evaluation include: lives in two story house, stairs to enter home, inability to ambulate household distances and inability to perform physical activity  Prior to admission, patient was independent with functional mobility without assistive device, independent with ADLS, independent with IADLS, living with   in a two level home with 3 steps to enter, ambulating household distance, ambulating community distances and retired  Please find objective findings from Physical Therapy assessment regarding body systems outlined above with impairments and limitations including weakness, impaired balance, decreased endurance, decreased activity tolerance and SOB upon exertion  The Barthel Index was used as a functional outcome tool presenting with a score of 95 today indicating minimal limitations of functional mobility and ADLS  Patient's clinical presentation is currently evolving as seen in patient's presentation of vital sign response, new onset of impairment of functional mobility, decreased endurance and new onset of weakness  Pt would benefit from continued Physical Therapy treatment to address deficits as defined above and maximize level of functional mobility  As demonstrated by objective findings, the assigned level of complexity for this evaluation is moderate  The patient's AM-[facility] Basic Mobility Inpatient Short Form Raw Score is 23   A Raw score of greater than 16 suggests the patient may benefit from discharge to home  Please also refer to the recommendation of the Physical Therapist for safe discharge planning  Goals   Patient Goals to go home soon   STG Expiration Date 01/18/22   Short Term Goal #1 Indep with ambulation in room without AD on RA with O2 sats greater than 95% with activity  LTG Expiration Date 01/25/22   Long Term Goal #1 Indep amb  without AD for functional household distances keeping O2 sats WNLs on RA   Plan   Treatment/Interventions Therapeutic exercise; Endurance training;Patient/family training;Gait training;Spoke to nursing;Spoke to case management;OT   PT Frequency 2-3x/wk   Recommendation   PT Discharge Recommendation No rehabilitation needs   Additional Comments Pt is essentially independent with functional mobility in room, however mildly unsteady probably due to generalized weakness from bedrest and covid fatigue  Will continue to follow to improve ambulatory endurance and strength monitoring O2 sats  AM-PAC Basic Mobility Inpatient   Turning in Bed Without Bedrails 4   Lying on Back to Sitting on Edge of Flat Bed 4   Moving Bed to Chair 4   Standing Up From Chair 4   Walk in Room 4   Climb 3-5 Stairs 3   Basic Mobility Inpatient Raw Score 23   Basic Mobility Standardized Score 50 88   Highest Level Of Mobility   JH-HLM Goal 7: Walk 25 feet or more   JH-HLM Highest Level of Mobility 7: Walk 25 feet or more   JH-HLM Goal Achieved Yes   Barthel Index   Feeding 10   Bathing 5   Grooming Score 5   Dressing Score 10   Bladder Score 10   Bowels Score 10   Toilet Use Score 10   Transfers (Bed/Chair) Score 15   Mobility (Level Surface) Score 15   Stairs Score 5   Barthel Index Score 95   Additional Treatment Session   Start Time 1110   End Time 1120   Treatment Assessment Pt ambulated around room several laps with changes in direction without AD and with modified independence  O2 sats monitored : ranged from 87% to 99%     During end of walking around room , about 120 feet ,  sats dropped briefly to 87% but recovered in a few seconds with seated rest   All on RA  Pt O2 sats at 94% at end of session  Reviewed deep breathing exercises, prone lying, and LE exercises : seated hip flexion, LAQs and heeltoe raises to be done independently during the day  Pt can see her O2 level on monitor in room  Pt verbalized understanding of all instructions  Tolerated well  Cont a few more sessions  No rehab needs upon d/c  End of Consult   Patient Position at End of Consult Seated edge of bed; All needs within reach   Licensure   [address] Number  Avery Lemons PT  [id number]

## 2022-01-12 PROBLEM — J12.82 PNEUMONIA DUE TO COVID-19 VIRUS: Status: ACTIVE | Noted: 2022-01-09

## 2022-01-12 LAB
ALBUMIN SERPL BCP-MCNC: 2.9 G/DL (ref 3.5–5)
ALP SERPL-CCNC: 72 U/L (ref 46–116)
ALT SERPL W P-5'-P-CCNC: 28 U/L (ref 12–78)
ANION GAP SERPL CALCULATED.3IONS-SCNC: 8 MMOL/L (ref 4–13)
AST SERPL W P-5'-P-CCNC: 19 U/L (ref 5–45)
BILIRUB SERPL-MCNC: 0.51 MG/DL (ref 0.2–1)
BUN SERPL-MCNC: 17 MG/DL (ref 5–25)
CALCIUM ALBUM COR SERPL-MCNC: 9.3 MG/DL (ref 8.3–10.1)
CALCIUM SERPL-MCNC: 8.4 MG/DL (ref 8.3–10.1)
CHLORIDE SERPL-SCNC: 107 MMOL/L (ref 100–108)
CO2 SERPL-SCNC: 26 MMOL/L (ref 21–32)
CREAT SERPL-MCNC: 0.72 MG/DL (ref 0.6–1.3)
CRP SERPL QL: 28.4 MG/L
D DIMER PPP FEU-MCNC: 0.39 UG/ML FEU
ERYTHROCYTE [DISTWIDTH] IN BLOOD BY AUTOMATED COUNT: 12.4 % (ref 11.6–15.1)
GFR SERPL CREATININE-BSD FRML MDRD: 89 ML/MIN/1.73SQ M
GLUCOSE SERPL-MCNC: 92 MG/DL (ref 65–140)
HCT VFR BLD AUTO: 37.7 % (ref 34.8–46.1)
HGB BLD-MCNC: 12 G/DL (ref 11.5–15.4)
MCH RBC QN AUTO: 28.2 PG (ref 26.8–34.3)
MCHC RBC AUTO-ENTMCNC: 31.8 G/DL (ref 31.4–37.4)
MCV RBC AUTO: 89 FL (ref 82–98)
PLATELET # BLD AUTO: 221 THOUSANDS/UL (ref 149–390)
PMV BLD AUTO: 11.1 FL (ref 8.9–12.7)
POTASSIUM SERPL-SCNC: 3.7 MMOL/L (ref 3.5–5.3)
PROCALCITONIN SERPL-MCNC: <0.05 NG/ML
PROT SERPL-MCNC: 6.1 G/DL (ref 6.4–8.2)
RBC # BLD AUTO: 4.26 MILLION/UL (ref 3.81–5.12)
SODIUM SERPL-SCNC: 141 MMOL/L (ref 136–145)
WBC # BLD AUTO: 4.77 THOUSAND/UL (ref 4.31–10.16)

## 2022-01-12 PROCEDURE — 99232 SBSQ HOSP IP/OBS MODERATE 35: CPT | Performed by: INTERNAL MEDICINE

## 2022-01-12 PROCEDURE — 80053 COMPREHEN METABOLIC PANEL: CPT | Performed by: INTERNAL MEDICINE

## 2022-01-12 PROCEDURE — 86140 C-REACTIVE PROTEIN: CPT | Performed by: INTERNAL MEDICINE

## 2022-01-12 PROCEDURE — 85379 FIBRIN DEGRADATION QUANT: CPT | Performed by: INTERNAL MEDICINE

## 2022-01-12 PROCEDURE — 85027 COMPLETE CBC AUTOMATED: CPT | Performed by: INTERNAL MEDICINE

## 2022-01-12 PROCEDURE — 84145 PROCALCITONIN (PCT): CPT | Performed by: INTERNAL MEDICINE

## 2022-01-12 RX ADMIN — BENZONATATE 100 MG: 100 CAPSULE ORAL at 08:36

## 2022-01-12 RX ADMIN — BENZONATATE 100 MG: 100 CAPSULE ORAL at 21:19

## 2022-01-12 RX ADMIN — FLUTICASONE FUROATE AND VILANTEROL TRIFENATATE 1 PUFF: 200; 25 POWDER RESPIRATORY (INHALATION) at 08:37

## 2022-01-12 RX ADMIN — BENZONATATE 100 MG: 100 CAPSULE ORAL at 18:01

## 2022-01-12 RX ADMIN — ENOXAPARIN SODIUM 30 MG: 30 INJECTION SUBCUTANEOUS at 08:36

## 2022-01-12 RX ADMIN — Medication 250 MG: at 18:01

## 2022-01-12 RX ADMIN — Medication 250 MG: at 08:36

## 2022-01-12 RX ADMIN — Medication 1 TABLET: at 08:37

## 2022-01-12 RX ADMIN — DEXAMETHASONE SODIUM PHOSPHATE 6 MG: 4 INJECTION INTRA-ARTICULAR; INTRALESIONAL; INTRAMUSCULAR; INTRAVENOUS; SOFT TISSUE at 08:37

## 2022-01-12 RX ADMIN — REMDESIVIR 100 MG: 100 INJECTION, POWDER, LYOPHILIZED, FOR SOLUTION INTRAVENOUS at 18:01

## 2022-01-12 RX ADMIN — ENOXAPARIN SODIUM 30 MG: 30 INJECTION SUBCUTANEOUS at 21:19

## 2022-01-12 RX ADMIN — LEVOTHYROXINE SODIUM 112 MCG: 112 TABLET ORAL at 05:14

## 2022-01-12 NOTE — PROGRESS NOTES
[address] Internal Medicine Progress Note  Patient: Sharan Damon 59 y o  female   MRN: [id number]  PCP: Tye Fernandez MD  Unit/Bed#: [address] [place] [address] Encounter: [id number]  Date Of Visit: 01/12/22    Problem List:    Principal Problem:    Acute respiratory failure with hypoxia ([address] )  Active Problems:    Pneumonia due to COVID-19 virus    Asthma    Hypothyroid    BMI 30 0-30 9,adult      Assessment & Plan:    Pneumonia due to COVID-19 virus  Assessment & Plan    CXR - consistent with COVID pneumonia  SARS-CoV-2 PCR positive-12/30  Reports improvement in shortness of breath cough and activities   oxygenation appears to be improving  · Medication treatment started per COVID protocol:  · Continue Dexamethasone 6 mg IV daily for 10 days  · Remdesivir 200 mg x 1 followed by 100 mg daily for 4 days  · Anticoagulation-Lovenox 30 mg q 12 hours  · Continue bronchodilators, continue Breo  · Symptomatic treatment for cough  · Home oxygen evaluation in a m  · Trend inflammatory markers, CRP  · Down trending  · Trend D-dimer  · Troponin is negative  · Daily CBC, CMP  · Trend procalcitonin - negative  · Patient was educated and encouraged self proing  · Increase activity and mobilization as tolerated  · PT/OT as needed          Results from last 7 days   Lab Units 01/11/22  0557 01/09/22  1227   CRP mg/L 33 6* 30 1*   D-DIMER QUANTITATIVE ug/ml FEU 0 41 0 54*      Results from last 7 days   Lab Units 01/11/22  0557   PROCALCITONIN ng/ml <0 05            * Acute respiratory failure with hypoxia (HCC)  Assessment & Plan  Secondary to COVID-19 in setting of history of asthma  Improving, titrate off oxygen  Bronchodilators, steroids    Asthma  Assessment & Plan  Continue Breo and p r n   Nebulizer  Continue IV dexamethasone, steroid taper on discharge    BMI 30 0-30 9,adult  Assessment & Plan  Lifestyle modifications  Outpatient follow-up    Hypothyroid  Assessment & Plan  Continue home Synthroid      Hypokalemia-replete    VTE Pharmacologic Prophylaxis:   Moderate Risk (Score 3-4) - Pharmacological DVT Prophylaxis Ordered: enoxaparin (Lovenox)  Patient Centered Rounds: I performed bedside rounds with nursing staff today  Discussions with Specialists or Other Care Team Provider:  Yes    Education and Discussions with Family / Patient: Updated  () via phone  Time Spent for Care: 45 minutes  More than 50% of total time spent on counseling and coordination of care as described above  Current Length of Stay: 3 day(s)  Current Patient Status: Inpatient   Certification Statement: The patient will continue to require additional inpatient hospital stay due to [place] hypoxia  Discharge Plan: Anticipate discharge in 24-48 hrs to home  Code Status: Level 1 - Full Code    Subjective:   Noted to be ambulating in the room , situation noted to be low 90 on room air  Reports improvement in shortness of breath  Cough is improving  Denies any chest pain or shortness of breath  Reports that she is she is able to move around better today without limiting shortness      Objective:     Vitals:   Temp (24hrs), Av 3 °F (36 8 °C), Min:98 2 °F (36 8 °C), Max:98 5 °F (36 9 °C)    Temp:  [98 2 °F (36 8 °C)-98 5 °F (36 9 °C)] 98 3 °F (36 8 °C)  HR:  [60-72] 72  Resp:  [14-31] 14  BP: (102-110)/(56-67) 109/56  SpO2:  [91 %-96 %] 92 % on room air  Body mass index is 30 23 kg/m²  Input and Output Summary (last 24 hours):   No intake or output data in the 24 hours ending 22 1349    Physical Exam:   Physical Exam  Constitutional:       General: She is not in acute distress  HENT:      Head: Normocephalic and atraumatic  Cardiovascular:      Rate and Rhythm: Normal rate  Pulmonary:      Effort: Pulmonary effort is normal  No respiratory distress  Breath sounds: Normal breath sounds  No wheezing or rales  Comments: Improving air entry  Abdominal:      General: Bowel sounds are normal  There is no distension  Palpations: Abdomen is soft  Tenderness: There is no abdominal tenderness  There is no guarding or rebound  Musculoskeletal:      Right lower leg: No edema  Left lower leg: No edema  Skin:     General: Skin is warm and dry  Findings: No rash  Neurological:      General: No focal deficit present  Mental Status: She is alert and oriented to person, place, and time  Mental status is at baseline  Additional Data:     Labs:  Results from last 7 days   Lab Units 01/12/22  0532 01/11/22  0557 01/11/22  0557   WBC Thousand/uL 4 77   < > 5 47   HEMOGLOBIN g/dL 12 0   < > 11 5   HEMATOCRIT % 37 7   < > 36 7   PLATELETS Thousands/uL 221   < > 198   NEUTROS PCT %  --   --  66   LYMPHS PCT %  --   --  24   MONOS PCT %  --   --  9   EOS PCT %  --   --  0    < > = values in this interval not displayed  Results from last 7 days   Lab Units 01/12/22  0532   SODIUM mmol/L 141   POTASSIUM mmol/L 3 7   CHLORIDE mmol/L 107   CO2 mmol/L 26   BUN mg/dL 17   CREATININE mg/dL 0 72   ANION GAP mmol/L 8   CALCIUM mg/dL 8 4   ALBUMIN g/dL 2 9*   TOTAL BILIRUBIN mg/dL 0 51   ALK PHOS U/L 72   ALT U/L 28   AST U/L 19   GLUCOSE RANDOM mg/dL 92                 Results from last 7 days   Lab Units 01/12/22  0532 01/11/22  0557   PROCALCITONIN ng/ml <0 05 <0 05       Lines/Drains:  Invasive Devices  Report    Peripheral Intravenous Line            Peripheral IV 01/09/22 Dorsal (posterior); Proximal;Right Forearm 3 days                      Imaging: Reviewed radiology reports from this admission including: chest xray    Recent Cultures (last 7 days):         Last 24 Hours Medication List:   Current Facility-Administered Medications   Medication Dose Route Frequency Provider Last Rate    acetaminophen  650 mg Oral Q6H PRN Damian Gomes DO      albuterol  2 5 mg Nebulization Q6H PRN Damian Gomes DO      benzonatate  100 mg Oral TID Marielle Mejia MD      dexamethasone  6 mg Intravenous Q24H Damian Nettles DO  dextromethorphan-guaiFENesin  10 mL Oral Q4H PRN Adam Rocha MD      enoxaparin  30 mg Subcutaneous Q12H Karley Javier MD      fluticasone-vilanterol  1 puff Inhalation Daily Agape Len Velasquez, DO      levothyroxine  112 mcg Oral Once per day on Mon Tue Wed Thu Fri aDmian Gomes DO      [START ON 1/15/2022] levothyroxine  125 mcg Oral Once per day on Sun Sat Agape Len Velasquez, DO      multivitamin-minerals  1 tablet Oral Daily Damian Gomes DO      ondansetron  4 mg Intravenous Q6H PRN Agape Len Velasquez, DO      remdesivir  100 mg Intravenous Q24H Damian Gomes DO      saccharomyces boulardii  250 mg Oral BID Agape Len Velasquez DO          Today, Patient Was Seen By: Naomi Long MD    ** Please Note: "This note has been constructed using a voice recognition system  Therefore there may be syntax, spelling, and/or grammatical errors   Please call if you have any questions  "**

## 2022-01-13 VITALS
RESPIRATION RATE: 20 BRPM | HEART RATE: 89 BPM | BODY MASS INDEX: 30.21 KG/M2 | TEMPERATURE: 99.1 F | WEIGHT: 160 LBS | SYSTOLIC BLOOD PRESSURE: 99 MMHG | DIASTOLIC BLOOD PRESSURE: 55 MMHG | HEIGHT: 61 IN | OXYGEN SATURATION: 95 %

## 2022-01-13 PROBLEM — J96.01 ACUTE RESPIRATORY FAILURE WITH HYPOXIA (HCC): Status: RESOLVED | Noted: 2022-01-09 | Resolved: 2022-01-13

## 2022-01-13 LAB
ALBUMIN SERPL BCP-MCNC: 2.9 G/DL (ref 3.5–5)
ALP SERPL-CCNC: 69 U/L (ref 46–116)
ALT SERPL W P-5'-P-CCNC: 25 U/L (ref 12–78)
ANION GAP SERPL CALCULATED.3IONS-SCNC: 11 MMOL/L (ref 4–13)
AST SERPL W P-5'-P-CCNC: 19 U/L (ref 5–45)
BILIRUB SERPL-MCNC: 0.53 MG/DL (ref 0.2–1)
BUN SERPL-MCNC: 16 MG/DL (ref 5–25)
CALCIUM ALBUM COR SERPL-MCNC: 9.1 MG/DL (ref 8.3–10.1)
CALCIUM SERPL-MCNC: 8.2 MG/DL (ref 8.3–10.1)
CHLORIDE SERPL-SCNC: 105 MMOL/L (ref 100–108)
CO2 SERPL-SCNC: 26 MMOL/L (ref 21–32)
CREAT SERPL-MCNC: 0.68 MG/DL (ref 0.6–1.3)
CRP SERPL QL: 21 MG/L
D DIMER PPP FEU-MCNC: 0.37 UG/ML FEU
ERYTHROCYTE [DISTWIDTH] IN BLOOD BY AUTOMATED COUNT: 12.3 % (ref 11.6–15.1)
GFR SERPL CREATININE-BSD FRML MDRD: 93 ML/MIN/1.73SQ M
GLUCOSE SERPL-MCNC: 87 MG/DL (ref 65–140)
HCT VFR BLD AUTO: 36 % (ref 34.8–46.1)
HGB BLD-MCNC: 11.9 G/DL (ref 11.5–15.4)
MCH RBC QN AUTO: 28.8 PG (ref 26.8–34.3)
MCHC RBC AUTO-ENTMCNC: 33.1 G/DL (ref 31.4–37.4)
MCV RBC AUTO: 87 FL (ref 82–98)
PLATELET # BLD AUTO: 248 THOUSANDS/UL (ref 149–390)
PMV BLD AUTO: 10.9 FL (ref 8.9–12.7)
POTASSIUM SERPL-SCNC: 3.6 MMOL/L (ref 3.5–5.3)
PROT SERPL-MCNC: 6 G/DL (ref 6.4–8.2)
RBC # BLD AUTO: 4.13 MILLION/UL (ref 3.81–5.12)
SODIUM SERPL-SCNC: 142 MMOL/L (ref 136–145)
WBC # BLD AUTO: 5.34 THOUSAND/UL (ref 4.31–10.16)

## 2022-01-13 PROCEDURE — 86140 C-REACTIVE PROTEIN: CPT | Performed by: INTERNAL MEDICINE

## 2022-01-13 PROCEDURE — 80053 COMPREHEN METABOLIC PANEL: CPT | Performed by: INTERNAL MEDICINE

## 2022-01-13 PROCEDURE — 99239 HOSP IP/OBS DSCHRG MGMT >30: CPT | Performed by: INTERNAL MEDICINE

## 2022-01-13 PROCEDURE — 94761 N-INVAS EAR/PLS OXIMETRY MLT: CPT

## 2022-01-13 PROCEDURE — 85379 FIBRIN DEGRADATION QUANT: CPT | Performed by: INTERNAL MEDICINE

## 2022-01-13 PROCEDURE — 85027 COMPLETE CBC AUTOMATED: CPT | Performed by: INTERNAL MEDICINE

## 2022-01-13 RX ORDER — PREDNISONE 10 MG/1
TABLET ORAL
Qty: 30 TABLET | Refills: 0 | Status: SHIPPED | OUTPATIENT
Start: 2022-01-13

## 2022-01-13 RX ORDER — ALBUTEROL SULFATE 2.5 MG/3ML
2.5 SOLUTION RESPIRATORY (INHALATION) EVERY 6 HOURS PRN
Qty: 360 ML | Refills: 0 | Status: SHIPPED | OUTPATIENT
Start: 2022-01-13 | End: 2022-02-12

## 2022-01-13 RX ORDER — GUAIFENESIN/DEXTROMETHORPHAN 100-10MG/5
10 SYRUP ORAL EVERY 4 HOURS PRN
Refills: 0
Start: 2022-01-13

## 2022-01-13 RX ORDER — BENZONATATE 100 MG/1
100 CAPSULE ORAL 3 TIMES DAILY
Qty: 20 CAPSULE | Refills: 0 | Status: SHIPPED | OUTPATIENT
Start: 2022-01-13

## 2022-01-13 RX ADMIN — ENOXAPARIN SODIUM 30 MG: 30 INJECTION SUBCUTANEOUS at 08:37

## 2022-01-13 RX ADMIN — Medication 1 TABLET: at 08:33

## 2022-01-13 RX ADMIN — LEVOTHYROXINE SODIUM 112 MCG: 112 TABLET ORAL at 06:01

## 2022-01-13 RX ADMIN — BENZONATATE 100 MG: 100 CAPSULE ORAL at 08:34

## 2022-01-13 RX ADMIN — FLUTICASONE FUROATE AND VILANTEROL TRIFENATATE 1 PUFF: 200; 25 POWDER RESPIRATORY (INHALATION) at 10:24

## 2022-01-13 RX ADMIN — DEXAMETHASONE SODIUM PHOSPHATE 6 MG: 4 INJECTION INTRA-ARTICULAR; INTRALESIONAL; INTRAMUSCULAR; INTRAVENOUS; SOFT TISSUE at 08:35

## 2022-01-13 RX ADMIN — Medication 250 MG: at 08:34

## 2022-01-13 RX ADMIN — REMDESIVIR 100 MG: 100 INJECTION, POWDER, LYOPHILIZED, FOR SOLUTION INTRAVENOUS at 12:39

## 2022-01-13 NOTE — DISCHARGE INSTRUCTIONS

## 2022-01-13 NOTE — PLAN OF CARE
Problem: Potential for Falls  Goal: Patient will remain free of falls  Description: INTERVENTIONS:  - Educate patient/family on patient safety including physical limitations  - Instruct patient to call for assistance with activity   - Consult OT/PT to assist with strengthening/mobility   - Keep Call bell within reach  - Keep bed low and locked with side rails adjusted as appropriate  - Keep care items and personal belongings within reach  - Initiate and maintain comfort rounds  - Make Fall Risk Sign visible to staff  - Offer Toileting every  Hours, in advance of need  - Initiate/Maintain alarm  - Obtain necessary fall risk management equipment:   - Apply yellow socks and bracelet for high fall risk patients  - Consider moving patient to room near nurses station  Outcome: Progressing     Problem: PAIN - ADULT  Goal: Verbalizes/displays adequate comfort level or baseline comfort level  Description: Interventions:  - Encourage patient to monitor pain and request assistance  - Assess pain using appropriate pain scale  - Administer analgesics based on type and severity of pain and evaluate response  - Implement non-pharmacological measures as appropriate and evaluate response  - Consider cultural and social influences on pain and pain management  - Notify physician/advanced practitioner if interventions unsuccessful or patient reports new pain  Outcome: Progressing     Problem: RESPIRATORY - ADULT  Goal: Achieves optimal ventilation and oxygenation  Description: INTERVENTIONS:  - Assess for changes in respiratory status  - Assess for changes in mentation and behavior  - Position to facilitate oxygenation and minimize respiratory effort  - Oxygen administered by appropriate delivery if ordered  - Initiate smoking cessation education as indicated  - Encourage broncho-pulmonary hygiene including cough, deep breathe, Incentive Spirometry  - Assess the need for suctioning and aspirate as needed  - Assess and instruct to report SOB or any respiratory difficulty  - Respiratory Therapy support as indicated  Outcome: Progressing     Problem: Prexisting or High Potential for Compromised Skin Integrity  Goal: Skin integrity is maintained or improved  Description: INTERVENTIONS:  - Identify patients at risk for skin breakdown  - Assess and monitor skin integrity  - Assess and monitor nutrition and hydration status  - Monitor labs   - Assess for incontinence   - Turn and reposition patient  - Assist with mobility/ambulation  - Relieve pressure over bony prominences  - Avoid friction and shearing  - Provide appropriate hygiene as needed including keeping skin clean and dry  - Evaluate need for skin moisturizer/barrier cream  - Collaborate with interdisciplinary team   - Patient/family teaching  - Consider wound care consult   Outcome: Progressing

## 2022-01-13 NOTE — DISCHARGE SUMMARY
Discharge Summary - [address] [facility]    Patient Information: Alina Collins 59 y o  female MRN: [id number]  Unit/Bed#: [address] [place] [address] [facility]- Encounter: [id number]    Discharging Physician / Practitioner: Naomi Long MD  PCP: Lydia Cummings MD  Admission Date: 1/9/2022  Discharge Date: 01/13/22    Reason for Admission: No chief complaint on file  Discharge Diagnoses:     Principal Problem (Resolved):    Acute respiratory failure with hypoxia (HCC)  Active Problems:    Pneumonia due to COVID-19 virus    Asthma    Hypothyroid    BMI 30 0-30 9,adult        Pneumonia due to COVID-19 virus  Assessment & Plan  Presented with worsening of shortness of breath and cough associated with nausea and loose bowel movement after COVID diagnosis on 12/30  CXR - consistent with COVID pneumonia  SARS-CoV-2 PCR positive-12/30  Treated as per COVID, mild protocol  Significant improvement with treatment  Respiratory symptoms significantly better  Does not require supplemental oxygen  Nausea is improving, tolerating diet  Denies any other GI symptoms  No further diarrhea  · Medication treatment as per COVID protocol:  · Received Dexamethasone 6 mg IV daily, during hospitalization will transition to prednisone taper on discharge  · Status post Remdesivir 200 mg x 1 followed by 100 mg daily for 4 days  · Anticoagulation-maintained on DVT prophylaxis, D-dimer level remain low  · Continue bronchodilators on discharge  · Continue Symptomatic treatment for cough  · Recommend to monitor respiratory symptoms, patient will follow-up with primary pulmonary after discharge  ·  inflammatory markers, CRP  · Trended,  ·  D-dimer-normal  · Troponin is negative  ·  procalcitonin - negative  · Recommended to continue incentive spirometry, increase activity as tolerated  · Close follow-up with PCP and Pulmonary,  · Discharge and follow-up after COVID pneumonia was recommended          Results from last 7 days   Lab Units 01/13/22  0615 01/12/22  0532 01/11/22  0557 01/09/22  1227   CRP mg/L 21 0* 28 4* 33 6* 30 1*   D-DIMER QUANTITATIVE ug/ml FEU 0 37 0 39 0 41 0 54*      Results from last 7 days   Lab Units 01/12/22  0532 01/11/22  0557   PROCALCITONIN ng/ml <0 05 <0 05            * Acute respiratory failure with hypoxia (HCC)-resolved as of 1/13/2022  Assessment & Plan  Secondary to COVID-19 in setting of history of asthma  Improved  Home oxygen evaluation was done on discharge, does not require supplemental oxygen at rest and with activity    Asthma  Assessment & Plan  With exacerbation in setting of COVID-19 infection  Improving  No wheezing on exam, saturating adequately on room air  Continue bronchodilators, Advair and prednisone taper on discharge  Follow-up with Pulmonary on discharge    BMI 30 0-30 9,adult  Assessment & Plan  Lifestyle modifications  Outpatient follow-up    Hypothyroid  Assessment & Plan  Continue home Synthroid      Consultations During Hospital Stay:  IP CONSULT TO CASE MANAGEMENT    Procedures Performed:     · Home oxygen evaluation-patient did not qualify for supplemental oxygen    Significant Findings:     · Refer to hospital course and above listed diagnosis related plan for details    Imaging while in hospital:    XR chest 1 view portable    Result Date: 1/10/2022  Narrative: CHEST INDICATION:   covid, hypoxia  Patient has confirmed COVID-19  COMPARISON:  1/5/2022 EXAM PERFORMED/VIEWS:  XR CHEST PORTABLE FINDINGS: Cardiomediastinal silhouette appears unremarkable  Diffuse bilateral multi lobar infiltrates  No pneumothorax or pleural effusion  Osseous structures appear within normal limits for patient age  Impression: Diffuse bilateral pneumonia compatible with confirmed COVID 19 infection  Workstation performed: [id number]     XR chest 1 view portable    Result Date: 1/6/2022  Narrative: CHEST INDICATION:   cp  Patient has confirmed COVID-19   COMPARISON:  None EXAM PERFORMED/VIEWS:  XR CHEST PORTABLE 1 image FINDINGS: Cardiomediastinal silhouette appears unremarkable  Faint groundglass opacities in the right upper lobe  Calcified granuloma at the right lung base  No pneumothorax or pleural effusion  Osseous structures appear within normal limits for patient age  Impression: Faint groundglass opacities in the right upper lobe concerning for Covid 19 pneumonia  Workstation performed: [id number]       Incidental Findings:   · None    Test Results Pending at Discharge (will require follow up):   · As per After Visit Summary     Outpatient Tests Requested:  · None    Complications:  Refer to hospital course and above listed diagnosis related plan, if any    Hospital Course: As per HPI  Debbi Looney is a 59 y o  female patient with history of asthma, hypothyroidism who originally presented to the hospital on 1/9/2022 due to worsening of shortness of breath and cough associated with nausea and loose bowel movement  Patient reported that her symptoms initially started on 12/30  When she was diagnosed to have COVID-19  In ED patient was noted to hypoxic at 87- 88% on room air and subsequently was admitted  Please see above list of diagnoses and related plan for additional information         Condition at Discharge: stable     Discharge Day Visit / Exam:     Subjective:  Reports that her breathing is much better  Cough is better  Reports generalized weakness but ambulating in room without any limiting symptoms  Denies any chest pain or abdominal pain, tolerating diet  Monitored off oxygen during interview, patient remained asymptomatic with saturation in mid 90s without any conversational dyspnea    Vitals: Blood Pressure: 107/65 (01/13/22 0745)  Pulse: 64 (01/13/22 0745)  Temperature: 99 1 °F (37 3 °C) (01/13/22 0745)  Temp Source: Oral (01/13/22 0745)  Respirations: 16 (01/13/22 0745)  Height: 5' 1" (154 9 cm) (01/09/22 1118)  Weight - Scale: 72 6 kg (160 lb) (01/09/22 1118)  SpO2: 95 % (01/13/22 0745)  Exam: Physical Exam  Constitutional:       General: She is not in acute distress  HENT:      Head: Normocephalic and atraumatic  Cardiovascular:      Rate and Rhythm: Normal rate  Pulmonary:      Effort: Pulmonary effort is normal  No respiratory distress  Breath sounds: Normal breath sounds  No wheezing or rales  Comments: Clear to auscultation, improving bilateral air entry  Abdominal:      General: Bowel sounds are normal  There is no distension  Palpations: Abdomen is soft  Tenderness: There is no abdominal tenderness  There is no guarding or rebound  Musculoskeletal:      Right lower leg: No edema  Left lower leg: No edema  Skin:     General: Skin is warm and dry  Findings: No rash  Neurological:      General: No focal deficit present  Mental Status: She is alert and oriented to person, place, and time  Mental status is at baseline  Psychiatric:         Mood and Affect: Mood normal          Discharge instructions/Information to patient and family:(Discharge Medications and Follow up):   See after visit summary for information provided to patient and family  Provisions for Follow-Up Care:  See after visit summary for information related to follow-up care and any pertinent home health orders  Disposition: Home    Planned Readmission:  No     Discharge Statement:  I spent 45 minutes discharging the patient  This time was spent on the day of discharge  I had direct contact with the patient on the day of discharge  Greater than 50% of the total time was spent examining patient, answering all patient questions, arranging and discussing plan of care with patient as well as directly providing post-discharge instructions  Additional time then spent on discharge activities  Discussed with patient regarding discharge and follow-up plan at length  Updated  yesterday regarding the plan      Discharge Medications:  See after visit summary for reconciled discharge medications provided to patient and family  ** Please Note: "This note has been constructed using a voice recognition system  Therefore there may be syntax, spelling, and/or grammatical errors   Please call if you have any questions  "**

## 2022-01-13 NOTE — NURSING NOTE
IV removed prior to discharge  AVS printed given and explained with all questions answered  Pt escorted to lobby by RN via wheel chair with all belongings  No prescriptions written

## 2022-01-13 NOTE — RESPIRATORY THERAPY NOTE
Home Oxygen Qualifying Test       Patient name: Alex Olsen        : 1960   Date of Test:  2022  Diagnosis:      Home Oxygen Test:    Medicare Guidelines require item(s) 1-5 on all ambulatory patients or 1 and 2 on non-ambulatory patients  1   Baseline SPO2 on Room Air at rest 95 %  2   SPO2 during exercise on Room Air 93%  During exercise monitor SpO2  If SPO2 increases >=89% with ambulation do not add supplemental             oxygen  If <= 88% on room air add O2 via NC and titrate patient  Patient must be ambulated with O2 and titrated to > 88% with exertion  3   SPO2 on Oxygen at rest na % na lpm     4   SPO2 during exercise on Oxygen  NA% a liter flow of na lpm     5   Exercise performed:                    []  Supplemental Home Oxygen is indicated  [x]  Client does not qualify for home oxygen        Respiratory Additional Notes-    Ramana Cruz, RT